# Patient Record
Sex: MALE | Race: WHITE | Employment: UNEMPLOYED | URBAN - METROPOLITAN AREA
[De-identification: names, ages, dates, MRNs, and addresses within clinical notes are randomized per-mention and may not be internally consistent; named-entity substitution may affect disease eponyms.]

---

## 2020-06-17 ENCOUNTER — HOSPITAL ENCOUNTER (INPATIENT)
Age: 35
LOS: 1 days | Discharge: HOME | DRG: 443 | End: 2020-06-18
Attending: EMERGENCY MEDICINE | Admitting: INTERNAL MEDICINE

## 2020-06-17 ENCOUNTER — APPOINTMENT (OUTPATIENT)
Dept: CT IMAGING | Age: 35
DRG: 443 | End: 2020-06-17

## 2020-06-17 ENCOUNTER — APPOINTMENT (OUTPATIENT)
Dept: ULTRASOUND IMAGING | Age: 35
DRG: 443 | End: 2020-06-17

## 2020-06-17 PROBLEM — R10.9 ABDOMINAL PAIN: Status: ACTIVE | Noted: 2020-06-17

## 2020-06-17 LAB
A/G RATIO: 1.4 (ref 1.1–2.2)
ACETAMINOPHEN LEVEL: <5 UG/ML (ref 10–30)
ALBUMIN SERPL-MCNC: 4.2 G/DL (ref 3.4–5)
ALP BLD-CCNC: 265 U/L (ref 40–129)
ALT SERPL-CCNC: 1863 U/L (ref 10–40)
ANION GAP SERPL CALCULATED.3IONS-SCNC: 13 MMOL/L (ref 3–16)
AST SERPL-CCNC: 1236 U/L (ref 15–37)
BASOPHILS ABSOLUTE: 0.1 K/UL (ref 0–0.2)
BASOPHILS RELATIVE PERCENT: 0.6 %
BILIRUB SERPL-MCNC: 4.4 MG/DL (ref 0–1)
BILIRUBIN URINE: ABNORMAL
BLOOD, URINE: ABNORMAL
BUN BLDV-MCNC: 10 MG/DL (ref 7–20)
CALCIUM SERPL-MCNC: 9 MG/DL (ref 8.3–10.6)
CHLORIDE BLD-SCNC: 93 MMOL/L (ref 99–110)
CLARITY: CLEAR
CO2: 22 MMOL/L (ref 21–32)
COLOR: ABNORMAL
CREAT SERPL-MCNC: 0.8 MG/DL (ref 0.9–1.3)
EOSINOPHILS ABSOLUTE: 0.3 K/UL (ref 0–0.6)
EOSINOPHILS RELATIVE PERCENT: 3.5 %
GFR AFRICAN AMERICAN: >60
GFR NON-AFRICAN AMERICAN: >60
GLOBULIN: 3 G/DL
GLUCOSE BLD-MCNC: 185 MG/DL (ref 70–99)
GLUCOSE URINE: 100 MG/DL
HCT VFR BLD CALC: 48.8 % (ref 40.5–52.5)
HEMOGLOBIN: 16.3 G/DL (ref 13.5–17.5)
INR BLD: 1.09 (ref 0.86–1.14)
KETONES, URINE: ABNORMAL MG/DL
LACTIC ACID, SEPSIS: 0.8 MMOL/L (ref 0.4–1.9)
LEUKOCYTE ESTERASE, URINE: NEGATIVE
LIPASE: 43 U/L (ref 13–60)
LYMPHOCYTES ABSOLUTE: 2.8 K/UL (ref 1–5.1)
LYMPHOCYTES RELATIVE PERCENT: 34.3 %
MCH RBC QN AUTO: 30.7 PG (ref 26–34)
MCHC RBC AUTO-ENTMCNC: 33.4 G/DL (ref 31–36)
MCV RBC AUTO: 91.8 FL (ref 80–100)
MICROSCOPIC EXAMINATION: YES
MONOCYTES ABSOLUTE: 1 K/UL (ref 0–1.3)
MONOCYTES RELATIVE PERCENT: 12.2 %
MUCUS: ABNORMAL /LPF
NEUTROPHILS ABSOLUTE: 4 K/UL (ref 1.7–7.7)
NEUTROPHILS RELATIVE PERCENT: 49.4 %
NITRITE, URINE: NEGATIVE
PDW BLD-RTO: 13.2 % (ref 12.4–15.4)
PH UA: 6.5 (ref 5–8)
PLATELET # BLD: 147 K/UL (ref 135–450)
PMV BLD AUTO: 8.7 FL (ref 5–10.5)
POTASSIUM SERPL-SCNC: 3.6 MMOL/L (ref 3.5–5.1)
PROTEIN UA: ABNORMAL MG/DL
PROTHROMBIN TIME: 12.6 SEC (ref 10–13.2)
RBC # BLD: 5.31 M/UL (ref 4.2–5.9)
RBC UA: ABNORMAL /HPF (ref 0–4)
SALICYLATE, SERUM: <0.3 MG/DL (ref 15–30)
SODIUM BLD-SCNC: 128 MMOL/L (ref 136–145)
SPECIFIC GRAVITY UA: 1.02 (ref 1–1.03)
TOTAL PROTEIN: 7.2 G/DL (ref 6.4–8.2)
URINE REFLEX TO CULTURE: ABNORMAL
URINE TYPE: ABNORMAL
UROBILINOGEN, URINE: 4 E.U./DL
WBC # BLD: 8.2 K/UL (ref 4–11)
WBC UA: ABNORMAL /HPF (ref 0–5)

## 2020-06-17 PROCEDURE — 2580000003 HC RX 258: Performed by: EMERGENCY MEDICINE

## 2020-06-17 PROCEDURE — 6370000000 HC RX 637 (ALT 250 FOR IP): Performed by: PHYSICIAN ASSISTANT

## 2020-06-17 PROCEDURE — 80053 COMPREHEN METABOLIC PANEL: CPT

## 2020-06-17 PROCEDURE — 83605 ASSAY OF LACTIC ACID: CPT

## 2020-06-17 PROCEDURE — 74176 CT ABD & PELVIS W/O CONTRAST: CPT

## 2020-06-17 PROCEDURE — 6370000000 HC RX 637 (ALT 250 FOR IP): Performed by: EMERGENCY MEDICINE

## 2020-06-17 PROCEDURE — 99285 EMERGENCY DEPT VISIT HI MDM: CPT

## 2020-06-17 PROCEDURE — 6360000002 HC RX W HCPCS: Performed by: PHYSICIAN ASSISTANT

## 2020-06-17 PROCEDURE — G0480 DRUG TEST DEF 1-7 CLASSES: HCPCS

## 2020-06-17 PROCEDURE — 2580000003 HC RX 258: Performed by: PHYSICIAN ASSISTANT

## 2020-06-17 PROCEDURE — 80074 ACUTE HEPATITIS PANEL: CPT

## 2020-06-17 PROCEDURE — 83690 ASSAY OF LIPASE: CPT

## 2020-06-17 PROCEDURE — 6360000002 HC RX W HCPCS: Performed by: EMERGENCY MEDICINE

## 2020-06-17 PROCEDURE — 96375 TX/PRO/DX INJ NEW DRUG ADDON: CPT

## 2020-06-17 PROCEDURE — 85610 PROTHROMBIN TIME: CPT

## 2020-06-17 PROCEDURE — 96374 THER/PROPH/DIAG INJ IV PUSH: CPT

## 2020-06-17 PROCEDURE — C9113 INJ PANTOPRAZOLE SODIUM, VIA: HCPCS | Performed by: PHYSICIAN ASSISTANT

## 2020-06-17 PROCEDURE — 99222 1ST HOSP IP/OBS MODERATE 55: CPT | Performed by: PHYSICIAN ASSISTANT

## 2020-06-17 PROCEDURE — 81001 URINALYSIS AUTO W/SCOPE: CPT

## 2020-06-17 PROCEDURE — 1200000000 HC SEMI PRIVATE

## 2020-06-17 PROCEDURE — 83036 HEMOGLOBIN GLYCOSYLATED A1C: CPT

## 2020-06-17 PROCEDURE — 76705 ECHO EXAM OF ABDOMEN: CPT

## 2020-06-17 PROCEDURE — 85025 COMPLETE CBC W/AUTO DIFF WBC: CPT

## 2020-06-17 PROCEDURE — 36415 COLL VENOUS BLD VENIPUNCTURE: CPT

## 2020-06-17 RX ORDER — PANTOPRAZOLE SODIUM 40 MG/10ML
40 INJECTION, POWDER, LYOPHILIZED, FOR SOLUTION INTRAVENOUS DAILY
Status: DISCONTINUED | OUTPATIENT
Start: 2020-06-17 | End: 2020-06-18 | Stop reason: HOSPADM

## 2020-06-17 RX ORDER — SODIUM CHLORIDE 0.9 % (FLUSH) 0.9 %
10 SYRINGE (ML) INJECTION EVERY 12 HOURS SCHEDULED
Status: DISCONTINUED | OUTPATIENT
Start: 2020-06-17 | End: 2020-06-18 | Stop reason: HOSPADM

## 2020-06-17 RX ORDER — PANTOPRAZOLE SODIUM 40 MG/1
40 TABLET, DELAYED RELEASE ORAL
Status: DISCONTINUED | OUTPATIENT
Start: 2020-06-18 | End: 2020-06-17

## 2020-06-17 RX ORDER — KETOROLAC TROMETHAMINE 30 MG/ML
15 INJECTION, SOLUTION INTRAMUSCULAR; INTRAVENOUS ONCE
Status: COMPLETED | OUTPATIENT
Start: 2020-06-17 | End: 2020-06-17

## 2020-06-17 RX ORDER — 0.9 % SODIUM CHLORIDE 0.9 %
1000 INTRAVENOUS SOLUTION INTRAVENOUS ONCE
Status: COMPLETED | OUTPATIENT
Start: 2020-06-17 | End: 2020-06-17

## 2020-06-17 RX ORDER — ONDANSETRON 2 MG/ML
4 INJECTION INTRAMUSCULAR; INTRAVENOUS ONCE
Status: COMPLETED | OUTPATIENT
Start: 2020-06-17 | End: 2020-06-17

## 2020-06-17 RX ORDER — ONDANSETRON 2 MG/ML
4 INJECTION INTRAMUSCULAR; INTRAVENOUS EVERY 6 HOURS PRN
Status: DISCONTINUED | OUTPATIENT
Start: 2020-06-17 | End: 2020-06-18 | Stop reason: HOSPADM

## 2020-06-17 RX ORDER — SODIUM CHLORIDE 0.9 % (FLUSH) 0.9 %
10 SYRINGE (ML) INJECTION PRN
Status: DISCONTINUED | OUTPATIENT
Start: 2020-06-17 | End: 2020-06-18 | Stop reason: HOSPADM

## 2020-06-17 RX ORDER — SODIUM CHLORIDE 9 MG/ML
INJECTION, SOLUTION INTRAVENOUS CONTINUOUS
Status: DISCONTINUED | OUTPATIENT
Start: 2020-06-17 | End: 2020-06-18 | Stop reason: HOSPADM

## 2020-06-17 RX ORDER — NICOTINE 21 MG/24HR
1 PATCH, TRANSDERMAL 24 HOURS TRANSDERMAL DAILY
Status: DISCONTINUED | OUTPATIENT
Start: 2020-06-17 | End: 2020-06-18 | Stop reason: HOSPADM

## 2020-06-17 RX ORDER — POLYETHYLENE GLYCOL 3350 17 G/17G
17 POWDER, FOR SOLUTION ORAL DAILY PRN
Status: DISCONTINUED | OUTPATIENT
Start: 2020-06-17 | End: 2020-06-18 | Stop reason: HOSPADM

## 2020-06-17 RX ORDER — OXYCODONE HYDROCHLORIDE 5 MG/1
5 TABLET ORAL EVERY 6 HOURS PRN
Status: DISCONTINUED | OUTPATIENT
Start: 2020-06-17 | End: 2020-06-18 | Stop reason: HOSPADM

## 2020-06-17 RX ORDER — ACETAMINOPHEN 650 MG/1
650 SUPPOSITORY RECTAL EVERY 6 HOURS PRN
Status: DISCONTINUED | OUTPATIENT
Start: 2020-06-17 | End: 2020-06-17

## 2020-06-17 RX ORDER — MORPHINE SULFATE 4 MG/ML
4 INJECTION, SOLUTION INTRAMUSCULAR; INTRAVENOUS ONCE
Status: COMPLETED | OUTPATIENT
Start: 2020-06-17 | End: 2020-06-17

## 2020-06-17 RX ORDER — PROMETHAZINE HYDROCHLORIDE 25 MG/1
12.5 TABLET ORAL EVERY 6 HOURS PRN
Status: DISCONTINUED | OUTPATIENT
Start: 2020-06-17 | End: 2020-06-18 | Stop reason: HOSPADM

## 2020-06-17 RX ORDER — ACETAMINOPHEN 325 MG/1
650 TABLET ORAL EVERY 6 HOURS PRN
Status: DISCONTINUED | OUTPATIENT
Start: 2020-06-17 | End: 2020-06-17

## 2020-06-17 RX ORDER — TRAMADOL HYDROCHLORIDE 50 MG/1
50 TABLET ORAL EVERY 6 HOURS PRN
Status: DISCONTINUED | OUTPATIENT
Start: 2020-06-17 | End: 2020-06-17

## 2020-06-17 RX ADMIN — SODIUM CHLORIDE: 9 INJECTION, SOLUTION INTRAVENOUS at 15:37

## 2020-06-17 RX ADMIN — KETOROLAC TROMETHAMINE 15 MG: 30 INJECTION, SOLUTION INTRAMUSCULAR at 08:53

## 2020-06-17 RX ADMIN — Medication 10 ML: at 21:28

## 2020-06-17 RX ADMIN — MORPHINE SULFATE 4 MG: 4 INJECTION, SOLUTION INTRAMUSCULAR; INTRAVENOUS at 12:28

## 2020-06-17 RX ADMIN — PANTOPRAZOLE SODIUM 40 MG: 40 INJECTION, POWDER, FOR SOLUTION INTRAVENOUS at 20:46

## 2020-06-17 RX ADMIN — OXYCODONE HYDROCHLORIDE 5 MG: 5 TABLET ORAL at 20:46

## 2020-06-17 RX ADMIN — ENOXAPARIN SODIUM 40 MG: 40 INJECTION SUBCUTANEOUS at 15:37

## 2020-06-17 RX ADMIN — ONDANSETRON HYDROCHLORIDE 4 MG: 2 INJECTION, SOLUTION INTRAMUSCULAR; INTRAVENOUS at 08:53

## 2020-06-17 RX ADMIN — TRAMADOL HYDROCHLORIDE 50 MG: 50 TABLET, FILM COATED ORAL at 15:37

## 2020-06-17 RX ADMIN — SODIUM CHLORIDE 1000 ML: 9 INJECTION, SOLUTION INTRAVENOUS at 08:53

## 2020-06-17 RX ADMIN — Medication 10 ML: at 20:53

## 2020-06-17 ASSESSMENT — ENCOUNTER SYMPTOMS
RHINORRHEA: 0
COUGH: 0
VOMITING: 0
BACK PAIN: 0
NAUSEA: 0
SHORTNESS OF BREATH: 0
DIARRHEA: 0
ABDOMINAL PAIN: 1
PHOTOPHOBIA: 0
WHEEZING: 0

## 2020-06-17 ASSESSMENT — PAIN DESCRIPTION - PAIN TYPE
TYPE: ACUTE PAIN

## 2020-06-17 ASSESSMENT — PAIN SCALES - GENERAL
PAINLEVEL_OUTOF10: 7
PAINLEVEL_OUTOF10: 8
PAINLEVEL_OUTOF10: 3
PAINLEVEL_OUTOF10: 7
PAINLEVEL_OUTOF10: 6
PAINLEVEL_OUTOF10: 6

## 2020-06-17 ASSESSMENT — PAIN DESCRIPTION - LOCATION
LOCATION: ABDOMEN

## 2020-06-17 NOTE — CONSULTS
Medications: sodium chloride flush, polyethylene glycol, promethazine **OR** ondansetron, traMADol  Allergies: Allergies   Allergen Reactions    Keflex [Cephalexin]        ROS:   Constitutional: negative for chills, fevers and sweats  Eyes: negative for cataracts, icterus and redness  Ears, nose, mouth, throat, and face: negative for epistaxis, hearing loss and sore throat  Respiratory: negative for cough, hemoptysis and sputum  Cardiovascular: negative for chest pain, dyspnea and lower extremity edema  Gastrointestinal: as per HPI  Genitourinary:negative for dysuria, frequency and hematuria  Neurological: negative for coordination problems, dizziness and gait problems  Behavioral/Psych: negative for anxiety, depression and mood swings    Physical Exam   /82   Pulse 76   Temp 97.7 °F (36.5 °C) (Oral)   Resp 16   Ht 6' 1\" (1.854 m)   Wt 190 lb (86.2 kg)   SpO2 98%   BMI 25.07 kg/m²     General appearance: alert, appears stated age, cooperative and no distress  Head: Normocephalic, without obvious abnormality, atraumatic  Eyes: conjunctivae/corneas clear. PERRL, EOM's intact. Fundi benign.   Neck: no adenopathy and supple, symmetrical, trachea midline  Lungs: clear to auscultation bilaterally  Heart: regular rate and rhythm, S1, S2 normal, no murmur, click, rub or gallop  Abdomen: normal findings: bowel sounds normal, no masses palpable and symmetric and abnormal findings:  distended, obese and tenderness moderate in the epigastrium, in the RUQ and in the left flank  Extremities: extremities normal, atraumatic, no cyanosis or edema    Lab and Imaging Review   Labs:  CBC:   Recent Labs     06/17/20  0835   WBC 8.2   HGB 16.3   HCT 48.8   MCV 91.8        BMP:   Recent Labs     06/17/20  0835   *   K 3.6   CL 93*   CO2 22   BUN 10   CREATININE 0.8*     LIVER PROFILE:   Recent Labs     06/17/20  0835   AST 1,236*   ALT 1,863*   LIPASE 43.0   PROT 7.2   BILITOT 4.4*   ALKPHOS 265*     PT/INR: gastritis, Naik's esophagus, or erosive gastritis. His elevated LFTs are likely related to active Hepatitis C superimposed with alcoholic hepatitis vs Hepatitis A or B infection. CT abd/pelvis and RUQ US are significant for stable liver morphology. Plan:   1. Continue supportive care  2. Monitor LFTs  3. Await  Acute Hepatitis Panel results  4. Monitor and document output  5.  alcohol, marijuana, tobacco cessation  6. PPI qAM  7. Advance to low fat diet as tolerated  8. Encourage nutrition  9. Consider eventual EGD   10. Consider additional blood tests pending hepatitis panel results including: HCV PCR Quant, autoimmune liver serologies, celiac panel, and iron studies  11. Will follow    Josselin Greene PA-C  4:17 PM 6/17/2020                      30year old male with a  history of chronic Hepatitis C, appendectomy, and hernia repair admitted with abdominal pain and acute hepatitis. Check hepatitis panel. Encourage nutrition. Ambulate TID. Start PPI. zofran as needed.     Mirna Cleveland MD          [phone]  [phone]

## 2020-06-17 NOTE — PROGRESS NOTES
Admission assessment completed. Pt denies open areas. Refused 4 eyes skin assessment. Patient is able to demonstrated the ability to move from a reclining position to an upright position within the recliner.

## 2020-06-17 NOTE — ED NOTES
Perfect serve message to Dr. Natalia Gan @ [id number]  06/17/20 1114    Yesi returned the call to Dr Jameson Wahl @ [id number]  06/17/20 1120

## 2020-06-17 NOTE — LETTER
[phone]  Phone: [phone]    No name on file. June 18, 2020     Patient: Landon Kerns   YOB: 1985   Date of Visit: 6/17/2020       To Whom It May Concern: It is my medical opinion that Landon Kerns was admitted to [facility] on 6/16/2020 and discharged on 6/18/2020. .    If you have any questions or concerns, please don't hesitate to call. Sincerely,        No name on file.

## 2020-06-18 VITALS
WEIGHT: 190 LBS | DIASTOLIC BLOOD PRESSURE: 72 MMHG | BODY MASS INDEX: 25.18 KG/M2 | OXYGEN SATURATION: 96 % | HEIGHT: 73 IN | TEMPERATURE: 98.6 F | HEART RATE: 74 BPM | RESPIRATION RATE: 14 BRPM | SYSTOLIC BLOOD PRESSURE: 121 MMHG

## 2020-06-18 LAB
ALBUMIN SERPL-MCNC: 3.4 G/DL (ref 3.4–5)
ALP BLD-CCNC: 207 U/L (ref 40–129)
ALT SERPL-CCNC: 1589 U/L (ref 10–40)
ANION GAP SERPL CALCULATED.3IONS-SCNC: 8 MMOL/L (ref 3–16)
AST SERPL-CCNC: 944 U/L (ref 15–37)
ATYPICAL LYMPHOCYTE RELATIVE PERCENT: 3 % (ref 0–6)
BASOPHILS ABSOLUTE: 0 K/UL (ref 0–0.2)
BASOPHILS RELATIVE PERCENT: 0 %
BILIRUB SERPL-MCNC: 4.2 MG/DL (ref 0–1)
BILIRUBIN DIRECT: 3.5 MG/DL (ref 0–0.3)
BILIRUBIN, INDIRECT: 0.7 MG/DL (ref 0–1)
BUN BLDV-MCNC: 8 MG/DL (ref 7–20)
CALCIUM SERPL-MCNC: 8.4 MG/DL (ref 8.3–10.6)
CHLORIDE BLD-SCNC: 99 MMOL/L (ref 99–110)
CO2: 23 MMOL/L (ref 21–32)
CREAT SERPL-MCNC: 0.9 MG/DL (ref 0.9–1.3)
EOSINOPHILS ABSOLUTE: 0.5 K/UL (ref 0–0.6)
EOSINOPHILS RELATIVE PERCENT: 5 %
ESTIMATED AVERAGE GLUCOSE: 102.5 MG/DL
GFR AFRICAN AMERICAN: >60
GFR NON-AFRICAN AMERICAN: >60
GLUCOSE BLD-MCNC: 110 MG/DL (ref 70–99)
HAV IGM SER IA-ACNC: ABNORMAL
HBA1C MFR BLD: 5.2 %
HCT VFR BLD CALC: 42.8 % (ref 40.5–52.5)
HEMOGLOBIN: 14.5 G/DL (ref 13.5–17.5)
HEPATITIS B CORE IGM ANTIBODY: ABNORMAL
HEPATITIS B SURFACE ANTIGEN INTERPRETATION: ABNORMAL
HEPATITIS C ANTIBODY INTERPRETATION: REACTIVE
LYMPHOCYTES ABSOLUTE: 4.7 K/UL (ref 1–5.1)
LYMPHOCYTES RELATIVE PERCENT: 48 %
MCH RBC QN AUTO: 30.8 PG (ref 26–34)
MCHC RBC AUTO-ENTMCNC: 33.8 G/DL (ref 31–36)
MCV RBC AUTO: 91.2 FL (ref 80–100)
MONOCYTES ABSOLUTE: 0.6 K/UL (ref 0–1.3)
MONOCYTES RELATIVE PERCENT: 6 %
NEUTROPHILS ABSOLUTE: 3.5 K/UL (ref 1.7–7.7)
NEUTROPHILS RELATIVE PERCENT: 38 %
PDW BLD-RTO: 13.2 % (ref 12.4–15.4)
PLATELET # BLD: 117 K/UL (ref 135–450)
PLATELET SLIDE REVIEW: ABNORMAL
PMV BLD AUTO: 9.5 FL (ref 5–10.5)
POTASSIUM REFLEX MAGNESIUM: 4.5 MMOL/L (ref 3.5–5.1)
RBC # BLD: 4.7 M/UL (ref 4.2–5.9)
SLIDE REVIEW: ABNORMAL
SODIUM BLD-SCNC: 130 MMOL/L (ref 136–145)
TOTAL PROTEIN: 6 G/DL (ref 6.4–8.2)
WBC # BLD: 9.2 K/UL (ref 4–11)

## 2020-06-18 PROCEDURE — 6370000000 HC RX 637 (ALT 250 FOR IP): Performed by: EMERGENCY MEDICINE

## 2020-06-18 PROCEDURE — 6370000000 HC RX 637 (ALT 250 FOR IP): Performed by: PHYSICIAN ASSISTANT

## 2020-06-18 PROCEDURE — C9113 INJ PANTOPRAZOLE SODIUM, VIA: HCPCS | Performed by: PHYSICIAN ASSISTANT

## 2020-06-18 PROCEDURE — 99238 HOSP IP/OBS DSCHRG MGMT 30/<: CPT | Performed by: INTERNAL MEDICINE

## 2020-06-18 PROCEDURE — 2580000003 HC RX 258: Performed by: PHYSICIAN ASSISTANT

## 2020-06-18 PROCEDURE — 80048 BASIC METABOLIC PNL TOTAL CA: CPT

## 2020-06-18 PROCEDURE — 85025 COMPLETE CBC W/AUTO DIFF WBC: CPT

## 2020-06-18 PROCEDURE — 36415 COLL VENOUS BLD VENIPUNCTURE: CPT

## 2020-06-18 PROCEDURE — 80076 HEPATIC FUNCTION PANEL: CPT

## 2020-06-18 PROCEDURE — 6360000002 HC RX W HCPCS: Performed by: PHYSICIAN ASSISTANT

## 2020-06-18 PROCEDURE — 87522 HEPATITIS C REVRS TRNSCRPJ: CPT

## 2020-06-18 RX ORDER — OXYCODONE HYDROCHLORIDE 5 MG/1
5 TABLET ORAL EVERY 6 HOURS PRN
Qty: 12 TABLET | Refills: 0 | Status: SHIPPED | OUTPATIENT
Start: 2020-06-18 | End: 2020-06-21

## 2020-06-18 RX ADMIN — ENOXAPARIN SODIUM 40 MG: 40 INJECTION SUBCUTANEOUS at 08:33

## 2020-06-18 RX ADMIN — PANTOPRAZOLE SODIUM 40 MG: 40 INJECTION, POWDER, FOR SOLUTION INTRAVENOUS at 08:33

## 2020-06-18 RX ADMIN — OXYCODONE HYDROCHLORIDE 5 MG: 5 TABLET ORAL at 03:23

## 2020-06-18 RX ADMIN — OXYCODONE HYDROCHLORIDE 5 MG: 5 TABLET ORAL at 09:34

## 2020-06-18 RX ADMIN — Medication 10 ML: at 08:34

## 2020-06-18 RX ADMIN — SODIUM CHLORIDE: 9 INJECTION, SOLUTION INTRAVENOUS at 03:23

## 2020-06-18 ASSESSMENT — PAIN SCALES - GENERAL
PAINLEVEL_OUTOF10: 5
PAINLEVEL_OUTOF10: 2
PAINLEVEL_OUTOF10: 5
PAINLEVEL_OUTOF10: 0

## 2020-06-18 ASSESSMENT — PAIN DESCRIPTION - DESCRIPTORS: DESCRIPTORS: DISCOMFORT

## 2020-06-18 ASSESSMENT — PAIN DESCRIPTION - FREQUENCY: FREQUENCY: CONTINUOUS

## 2020-06-18 ASSESSMENT — PAIN DESCRIPTION - ONSET: ONSET: ON-GOING

## 2020-06-18 ASSESSMENT — PAIN DESCRIPTION - PAIN TYPE: TYPE: ACUTE PAIN

## 2020-06-18 ASSESSMENT — PAIN DESCRIPTION - PROGRESSION: CLINICAL_PROGRESSION: GRADUALLY WORSENING

## 2020-06-18 ASSESSMENT — PAIN DESCRIPTION - LOCATION: LOCATION: ABDOMEN

## 2020-06-18 NOTE — PROGRESS NOTES
C/o lower back pain rate as 5/10. Requesting pain medication. Oxycodone given. See MAR. Call light in reach.

## 2020-06-18 NOTE — PROGRESS NOTES
Patient assessment completed, see flow sheet. Medications being administered per MAR. Patient in bed, call light is within reach, bed in lowest, locked position. Patient denies further needs at this time. Will continue to monitor and assess.

## 2020-06-18 NOTE — DISCHARGE SUMMARY
Name:  Kate Jeffers  Room:  0214/0214-01  MRN:    [id number]    Discharge Summary      This discharge summary is in conjunction with a complete physical exam done on the day of discharge. Discharging Physician: Briseyda Chavarria MD     Admit: 6/17/2020  Discharge:  6/18/2020    HPI taken from admission H&P:      The patient is a 30 y.o. male with no significant PMH who presented to [facility] ED with complaint of abdominal pain, nausea vomiting and back pain. Patient states that this all happened about 5 days ago. He states that he is recently been rosibel and thought maybe he had over exerted himself as he came home very nauseous and feeling unwell. He states for the last few days he has not even been able to go to work as his symptoms have continued to worsen. He is noticed a lot of right upper abdominal pain that radiates to his back and causes him severe nausea. He reports that he typically eats about 6 meals per day but has not been able to eat much over the last few days because of the pain. He reports some yellowing of his skin over the last few days as well. He reports a history of hepatitis C from prior drug abuse over 12 years ago, reports no prior treatment. He is a daily drinker of about 8-10 shots of whiskey for the last 5+ years. Patient states that over the last few days given all of his symptoms he has not been able to drink and has not had any concerning withdrawal symptoms.      Patient's wife is bedside and reports patient has had fevers up to 101 at home over the last few days as well. No known sick contacts.      Diagnoses this Admission and Hospital Course     Abdominal Pain  Transaminitis  Hyperbilirubinemia  - concern for acute hepatitis  - CT abdomen: mild inflammatory stranding w/in GB; U/S GB: contracted GB w/o findings of cholelithiasis  - ALT: 1863, AST 1236, Bili: 4.4, INR normal  - Admitted to Med Surg  - trended LFTs - with improvement, checked acute hepatitis panel - Hep C

## 2020-06-19 ENCOUNTER — HOSPITAL ENCOUNTER (EMERGENCY)
Age: 35
Discharge: HOME | End: 2020-06-19
Attending: EMERGENCY MEDICINE

## 2020-06-19 ENCOUNTER — TELEPHONE (OUTPATIENT)
Dept: INTERNAL MEDICINE CLINIC | Age: 35
End: 2020-06-19

## 2020-06-19 VITALS
HEART RATE: 82 BPM | TEMPERATURE: 98.4 F | SYSTOLIC BLOOD PRESSURE: 121 MMHG | OXYGEN SATURATION: 96 % | BODY MASS INDEX: 25.31 KG/M2 | DIASTOLIC BLOOD PRESSURE: 81 MMHG | HEIGHT: 73 IN | WEIGHT: 191 LBS | RESPIRATION RATE: 15 BRPM

## 2020-06-19 LAB
A/G RATIO: 1.3 (ref 1.1–2.2)
ALBUMIN SERPL-MCNC: 3.9 G/DL (ref 3.4–5)
ALP BLD-CCNC: 226 U/L (ref 40–129)
ALT SERPL-CCNC: 1366 U/L (ref 10–40)
ANION GAP SERPL CALCULATED.3IONS-SCNC: 8 MMOL/L (ref 3–16)
AST SERPL-CCNC: 528 U/L (ref 15–37)
ATYPICAL LYMPHOCYTE RELATIVE PERCENT: 28 % (ref 0–6)
BANDED NEUTROPHILS RELATIVE PERCENT: 3 % (ref 0–7)
BASOPHILS ABSOLUTE: 0.1 K/UL (ref 0–0.2)
BASOPHILS RELATIVE PERCENT: 1 %
BILIRUB SERPL-MCNC: 4.9 MG/DL (ref 0–1)
BILIRUBIN DIRECT: 4 MG/DL (ref 0–0.3)
BILIRUBIN, INDIRECT: 0.9 MG/DL (ref 0–1)
BUN BLDV-MCNC: 10 MG/DL (ref 7–20)
CALCIUM SERPL-MCNC: 8.9 MG/DL (ref 8.3–10.6)
CHLORIDE BLD-SCNC: 95 MMOL/L (ref 99–110)
CO2: 27 MMOL/L (ref 21–32)
CREAT SERPL-MCNC: 0.9 MG/DL (ref 0.9–1.3)
EKG ATRIAL RATE: 82 BPM
EKG DIAGNOSIS: NORMAL
EKG P AXIS: 40 DEGREES
EKG P-R INTERVAL: 196 MS
EKG Q-T INTERVAL: 348 MS
EKG QRS DURATION: 98 MS
EKG QTC CALCULATION (BAZETT): 406 MS
EKG R AXIS: 11 DEGREES
EKG T AXIS: 20 DEGREES
EKG VENTRICULAR RATE: 82 BPM
EOSINOPHILS ABSOLUTE: 0.1 K/UL (ref 0–0.6)
EOSINOPHILS RELATIVE PERCENT: 1 %
GFR AFRICAN AMERICAN: >60
GFR NON-AFRICAN AMERICAN: >60
GLOBULIN: 2.9 G/DL
GLUCOSE BLD-MCNC: 150 MG/DL (ref 70–99)
HCT VFR BLD CALC: 42.6 % (ref 40.5–52.5)
HEMATOLOGY PATH CONSULT: NORMAL
HEMATOLOGY PATH CONSULT: YES
HEMOGLOBIN: 14.7 G/DL (ref 13.5–17.5)
INR BLD: 1.11 (ref 0.86–1.14)
LIPASE: 34 U/L (ref 13–60)
LYMPHOCYTES ABSOLUTE: 7.7 K/UL (ref 1–5.1)
LYMPHOCYTES RELATIVE PERCENT: 39 %
MCH RBC QN AUTO: 31.2 PG (ref 26–34)
MCHC RBC AUTO-ENTMCNC: 34.4 G/DL (ref 31–36)
MCV RBC AUTO: 90.5 FL (ref 80–100)
MONOCYTES ABSOLUTE: 0.5 K/UL (ref 0–1.3)
MONOCYTES RELATIVE PERCENT: 4 %
NEUTROPHILS ABSOLUTE: 3.1 K/UL (ref 1.7–7.7)
NEUTROPHILS RELATIVE PERCENT: 24 %
PDW BLD-RTO: 13.4 % (ref 12.4–15.4)
PLATELET # BLD: 132 K/UL (ref 135–450)
PMV BLD AUTO: 8.9 FL (ref 5–10.5)
POTASSIUM SERPL-SCNC: 3.8 MMOL/L (ref 3.5–5.1)
PROTHROMBIN TIME: 12.9 SEC (ref 10–13.2)
RBC # BLD: 4.71 M/UL (ref 4.2–5.9)
RBC # BLD: NORMAL 10*6/UL
SODIUM BLD-SCNC: 130 MMOL/L (ref 136–145)
TOTAL PROTEIN: 6.8 G/DL (ref 6.4–8.2)
TROPONIN: <0.01 NG/ML
WBC # BLD: 11.5 K/UL (ref 4–11)

## 2020-06-19 PROCEDURE — 85610 PROTHROMBIN TIME: CPT

## 2020-06-19 PROCEDURE — 93005 ELECTROCARDIOGRAM TRACING: CPT | Performed by: EMERGENCY MEDICINE

## 2020-06-19 PROCEDURE — 36415 COLL VENOUS BLD VENIPUNCTURE: CPT

## 2020-06-19 PROCEDURE — 83690 ASSAY OF LIPASE: CPT

## 2020-06-19 PROCEDURE — 99283 EMERGENCY DEPT VISIT LOW MDM: CPT

## 2020-06-19 PROCEDURE — 84484 ASSAY OF TROPONIN QUANT: CPT

## 2020-06-19 PROCEDURE — 85025 COMPLETE CBC W/AUTO DIFF WBC: CPT

## 2020-06-19 PROCEDURE — 80053 COMPREHEN METABOLIC PANEL: CPT

## 2020-06-19 PROCEDURE — 82248 BILIRUBIN DIRECT: CPT

## 2020-06-19 PROCEDURE — 6370000000 HC RX 637 (ALT 250 FOR IP): Performed by: EMERGENCY MEDICINE

## 2020-06-19 PROCEDURE — 93010 ELECTROCARDIOGRAM REPORT: CPT | Performed by: INTERNAL MEDICINE

## 2020-06-19 RX ORDER — DIPHENHYDRAMINE HCL 25 MG
50 TABLET ORAL ONCE
Status: COMPLETED | OUTPATIENT
Start: 2020-06-19 | End: 2020-06-19

## 2020-06-19 RX ADMIN — DIPHENHYDRAMINE HCL 50 MG: 25 TABLET ORAL at 02:51

## 2020-06-19 ASSESSMENT — ENCOUNTER SYMPTOMS
BACK PAIN: 0
CHEST TIGHTNESS: 0
ABDOMINAL PAIN: 0
SHORTNESS OF BREATH: 1
STRIDOR: 0
NAUSEA: 0
COUGH: 0
WHEEZING: 0
VOMITING: 0
COLOR CHANGE: 0
DIARRHEA: 0

## 2020-06-19 NOTE — ED PROVIDER NOTES
Negative for chest pain, palpitations and leg swelling. Gastrointestinal: Negative for abdominal pain, diarrhea, nausea and vomiting. Genitourinary: Negative for decreased urine volume, difficulty urinating, dysuria, flank pain, frequency and urgency. Urine is dark colored per patient similar to when in the hospital     Musculoskeletal: Negative for back pain and neck pain. Skin: Positive for wound (near umbilicus with minimal bleeding noted per patient). Negative for color change. Neurological: Negative for syncope, weakness and headaches. Hematological: Does not bruise/bleed easily. Psychiatric/Behavioral: Negative for confusion. Positives and Pertinent negatives as per HPI. PASTMEDICAL HISTORY     Past Medical History:   Diagnosis Date    Liver cirrhosis ([facility] [address]          SURGICAL HISTORY       Past Surgical History:   Procedure Laterality Date    APPENDECTOMY      HERNIA REPAIR           CURRENT MEDICATIONS       Discharge Medication List as of 6/19/2020  3:17 AM      CONTINUE these medications which have NOT CHANGED    Details   oxyCODONE (ROXICODONE) 5 MG immediate release tablet Take 1 tablet by mouth every 6 hours as needed for Pain for up to 3 days. Intended supply: 3 days. Take lowest dose possible to manage pain, Disp-12 tablet, R-0Normal             ALLERGIES     Keflex [cephalexin]    FAMILY HISTORY     History reviewed. No pertinent family history.        SOCIAL HISTORY       Social History     Socioeconomic History    Marital status: Single     Spouse name: None    Number of children: None    Years of education: None    Highest education level: None   Occupational History    None   Social Needs    Financial resource strain: None    Food insecurity     Worry: None     Inability: None    Transportation needs     Medical: None     Non-medical: None   Tobacco Use    Smoking status: Current Every Day Smoker     Packs/day: 1.00     Types: Cigarettes    Smokeless tobacco: Never Used   Substance and Sexual Activity    Alcohol use: Yes     Comment: whiskey not every day    Drug use: Yes     Types: Marijuana    Sexual activity: Yes   Lifestyle    Physical activity     Days per week: None     Minutes per session: None    Stress: None   Relationships    Social connections     Talks on phone: None     Gets together: None     Attends [employer] service: None     Active member of club or organization: None     Attends meetings of clubs or organizations: None     Relationship status: None    Intimate partner violence     Fear of current or ex partner: None     Emotionally abused: None     Physically abused: None     Forced sexual activity: None   Other Topics Concern    None   Social History Narrative    None       SCREENINGS    Kelvin Coma Scale  Eye Opening: Spontaneous  Best Verbal Response: Oriented  Best Motor Response: Obeys commands  Kelvin Coma Scale Score: 15           PHYSICAL EXAM    (up to 7 for level 4, 8 or more for level 5)     ED Triage Vitals   BP Temp Temp src Pulse Resp SpO2 Height Weight   -- -- -- -- -- -- -- --       Physical Exam  Vitals signs and nursing note reviewed. Constitutional:       General: He is awake. He is not in acute distress. Appearance: Normal appearance. He is well-developed, well-groomed and overweight. He is not ill-appearing, toxic-appearing or diaphoretic. HENT:      Head: Normocephalic and atraumatic. Right Ear: External ear normal.      Left Ear: External ear normal.      Nose: Nose normal.      Mouth/Throat:      Mouth: Mucous membranes are moist.   Eyes:      General: Scleral icterus present. Right eye: No discharge. Left eye: No discharge. Neck:      Musculoskeletal: Full passive range of motion without pain, normal range of motion and neck supple. Normal range of motion. No edema, erythema, neck rigidity or muscular tenderness. Trachea: Trachea normal. No tracheal deviation. Cardiovascular:      Rate and Rhythm: Normal rate and regular rhythm. Pulses: Normal pulses. Pulmonary:      Effort: Pulmonary effort is normal. No accessory muscle usage or respiratory distress. Breath sounds: Normal breath sounds. No stridor. No decreased breath sounds, wheezing, rhonchi or rales. Chest:      Chest wall: No tenderness. Abdominal:      General: Abdomen is flat. Bowel sounds are normal. There is no distension. There are signs of injury (scratch noted to umbilicus). Palpations: Abdomen is soft. There is no shifting dullness. Tenderness: There is no abdominal tenderness. There is no guarding or rebound. Negative signs include Tolentino's sign and McBurney's sign. Musculoskeletal: Normal range of motion. General: No swelling, tenderness, deformity or signs of injury. Right lower leg: No edema. Left lower leg: No edema. Skin:     General: Skin is warm and dry. Coloration: Skin is jaundiced. Skin is not ashen, cyanotic or pale. Findings: Abrasion (near umbilicus, no laceration or active bleeding, appears to be a small scab in the umbilicus, no concern for infection or Flood's syndrome ) and wound present. No abscess, acne, bruising, burn, ecchymosis, erythema, signs of injury, laceration, lesion, petechiae or rash. Neurological:      General: No focal deficit present. Mental Status: He is alert and oriented to person, place, and time. Mental status is at baseline. GCS: GCS eye subscore is 4. GCS verbal subscore is 5. GCS motor subscore is 6. Motor: No tremor, atrophy, abnormal muscle tone or seizure activity. Psychiatric:         Mood and Affect: Mood normal.         Behavior: Behavior normal. Behavior is cooperative.              DIAGNOSTIC RESULTS   :    Labs Reviewed   CBC WITH AUTO DIFFERENTIAL - Abnormal; Notable for the following components:       Result Value    WBC 11.5 (*)     Platelets 378 (*)     Lymphocytes Absolute visit well. The patient and / or the family were informed of the results of any tests, a time was given to answer questions. FINAL IMPRESSION      1. Abrasion of abdominal wall, initial encounter    2. Acute hepatitis    3. Hyperbilirubinemia    4.  Lymphocytosis        DISPOSITION/PLAN   DISPOSITION  -discharged in improved, stable condition      PATIENT REFERRED TO:  [facility]  ED  [address]  Go to   If symptoms worsen    Lu Granados MD  [address] [place], [address] [address]    Schedule an appointment as soon as possible for a visit in 2 weeks        DISCHARGEMEDICATIONS:  Discharge Medication List as of 6/19/2020  3:17 AM          DISCONTINUED MEDICATIONS:  Discharge Medication List as of 6/19/2020  3:17 AM                 (Please note that portions of this note were completed with a voicerecognition program.  Efforts were made to edit the dictations but occasionally words are mis-transcribed.)    Sabrina De La Cruz MD (electronically signed)            Sabrina De La Cruz MD  06/19/20 [id number]

## 2020-06-20 LAB
HCV QNT BY NAAT IU/ML: ABNORMAL
HCV QNT BY NAAT LOG IU/ML: 5.16 LOG IU/ML
INTERPRETATION: DETECTED

## 2021-08-19 ENCOUNTER — HOSPITAL ENCOUNTER (EMERGENCY)
Age: 36
Discharge: HOME | End: 2021-08-19
Payer: COMMERCIAL

## 2021-08-19 VITALS
WEIGHT: 190 LBS | TEMPERATURE: 98.2 F | SYSTOLIC BLOOD PRESSURE: 139 MMHG | DIASTOLIC BLOOD PRESSURE: 99 MMHG | HEART RATE: 80 BPM | RESPIRATION RATE: 18 BRPM | HEIGHT: 73 IN | BODY MASS INDEX: 25.18 KG/M2 | OXYGEN SATURATION: 98 %

## 2021-08-19 DIAGNOSIS — Z20.822 LAB TEST NEGATIVE FOR COVID-19 VIRUS: Primary | ICD-10-CM

## 2021-08-19 LAB — SARS-COV-2, NAAT: NOT DETECTED

## 2021-08-19 PROCEDURE — 87635 SARS-COV-2 COVID-19 AMP PRB: CPT

## 2021-08-19 PROCEDURE — 99283 EMERGENCY DEPT VISIT LOW MDM: CPT

## 2021-08-19 ASSESSMENT — ENCOUNTER SYMPTOMS
DIARRHEA: 1
SHORTNESS OF BREATH: 0
ABDOMINAL PAIN: 0
VOMITING: 1
NAUSEA: 1
COUGH: 0

## 2021-08-20 NOTE — ED PROVIDER NOTES
[address] ED  EMERGENCY DEPARTMENT ENCOUNTER        Pt Name: Deanne Ellis  MRN: [id number]  [place] 1985  Date of evaluation: 8/19/2021  Provider: Selvin Varghese PA-C  PCP: No primary care provider on file. Shared Visit or Autonomous Visit: JOSE LUIS. I have evaluated this patient. My supervising physician was available for consultation. CHIEF COMPLAINT       Chief Complaint   Patient presents with    Concern For COVID-19     Pt had vomiting and diarrhea for 2 days (Tues. & Wed.) So pt's place of employment wants him to get a COVID test before going back to work. Pt states that he is feeling fine today, was able to eat and keep it down. HISTORY OF PRESENT ILLNESS   (Location/Symptom, Timing/Onset, Context/Setting, Quality, Duration, Modifying Factors, Severity)  Note limiting factors. Deanne Ellis is a 29 y.o. male presenting to the emergency department with request of COVID-19 test.  States his work told him he needed to be tested before he came back to work. He had missed the past couple days of work he had vomiting and diarrhea states symptoms have now resolved he is eating and drinking without any difficulty. States he just drank a large malt prior to coming him no longer having symptoms. No abdominal pain. No fever. No cough or URI symptoms. No chest pain or shortness of breath. States his child and significant other had similar symptoms last about 24 hours and their symptoms have also resolved. The history is provided by the patient. Nursing Notes were reviewed    REVIEW OF SYSTEMS    (2-9 systems for level 4, 10 or more for level 5)     Review of Systems   Constitutional: Negative for fever. HENT: Negative for congestion. Respiratory: Negative for cough and shortness of breath. Cardiovascular: Negative for chest pain. Gastrointestinal: Positive for diarrhea, nausea and vomiting. Negative for abdominal pain.        Positives and Pertinent negatives as per HPI. PAST MEDICAL HISTORY     Past Medical History:   Diagnosis Date    Hepatitis C 06/17/2020    Liver cirrhosis ([facility] [address]          SURGICAL HISTORY     Past Surgical History:   Procedure Laterality Date    APPENDECTOMY      HERNIA REPAIR           CURRENTMEDICATIONS       Previous Medications    No medications on file         ALLERGIES     Keflex [cephalexin]    FAMILYHISTORY     History reviewed. No pertinent family history. SOCIAL HISTORY       Social History     Socioeconomic History    Marital status: Single     Spouse name: None    Number of children: None    Years of education: None    Highest education level: None   Occupational History    None   Tobacco Use    Smoking status: Current Every Day Smoker     Packs/day: 1.00     Types: Cigarettes    Smokeless tobacco: Never Used   Vaping Use    Vaping Use: Never used   Substance and Sexual Activity    Alcohol use: Yes     Comment: whiskey not every day    Drug use: Yes     Types: Marijuana    Sexual activity: Yes   Other Topics Concern    None   Social History Narrative    None     Social Determinants of Health     Financial Resource Strain:     Difficulty of Paying Living Expenses:    Food Insecurity:     Worried About Running Out of Food in the Last Year:     Ran Out of Food in the Last Year:    Transportation Needs:     Lack of Transportation (Medical):      Lack of Transportation (Non-Medical):    Physical Activity:     Days of Exercise per Week:     Minutes of Exercise per Session:    Stress:     Feeling of Stress :    Social Connections:     Frequency of Communication with Friends and Family:     Frequency of Social Gatherings with Friends and Family:     Attends [employer] Services:     Active Member of Clubs or Organizations:     Attends Club or Organization Meetings:     Marital Status:    Intimate Partner Violence:     Fear of Current or Ex-Partner:     Emotionally Abused:     Physically Abused:  Sexually Abused:        SCREENINGS             PHYSICAL EXAM    (up to 7 for level 4, 8 or more for level 5)     ED Triage Vitals [08/19/21 2006]   BP Temp Temp Source Pulse Resp SpO2 Height Weight   (!) 139/99 98.2 °F (36.8 °C) Oral 80 18 98 % 6' 1\" (1.854 m) 190 lb (86.2 kg)       Physical Exam  Vitals and nursing note reviewed. Constitutional:       Appearance: He is well-developed. He is not ill-appearing or toxic-appearing. HENT:      Head: Normocephalic and atraumatic. Mouth/Throat:      Mouth: Mucous membranes are moist.      Pharynx: Oropharynx is clear. No pharyngeal swelling or posterior oropharyngeal erythema. Eyes:      Conjunctiva/sclera: Conjunctivae normal.   Cardiovascular:      Rate and Rhythm: Normal rate and regular rhythm. Heart sounds: Normal heart sounds. Pulmonary:      Effort: Pulmonary effort is normal. No respiratory distress. Breath sounds: Normal breath sounds. No stridor. No wheezing, rhonchi or rales. Abdominal:      General: Bowel sounds are normal.      Palpations: Abdomen is soft. Tenderness: There is no abdominal tenderness. There is no right CVA tenderness, left CVA tenderness, guarding or rebound. Skin:     General: Skin is warm and dry. Neurological:      Mental Status: He is alert and oriented to person, place, and time. Motor: No abnormal muscle tone. Psychiatric:         Behavior: Behavior normal.         DIAGNOSTIC RESULTS   LABS:    Labs Reviewed   COVID-19, RAPID    Narrative:     Performed at:  [facility] - [facility],  [place], [facility]   Phone [phone]       All other labs were within normal range or not returned as of this dictation. EKG: All EKG's are interpreted by the Emergency Department Physician in the absence of a cardiologist.  Please see their note for interpretation of EKG.       RADIOLOGY:   Non-plain film images such as CT, Ultrasound and MRI are read by the radiologist. Andrew Watts radiographic images are visualized andpreliminarily interpreted by the  ED Provider with the below findings:        Interpretation radha Radiologist below, if available at the time of this note:    No orders to display     No results found. PROCEDURES   Unless otherwise noted below, none     Procedures    CRITICAL CARE TIME   N/A    CONSULTS:  None      EMERGENCY DEPARTMENT COURSE and DIFFERENTIAL DIAGNOSIS/MDM:   Vitals:    Vitals:    08/19/21 2006   BP: (!) 139/99   Pulse: 80   Resp: 18   Temp: 98.2 °F (36.8 °C)   TempSrc: Oral   SpO2: 98%   Weight: 190 lb (86.2 kg)   Height: 6' 1\" (1.854 m)       Patient was given thefollowing medications:  Medications - No data to display        ED course  Patient presented to the ER requesting COVID-19 test.  States his employer is requiring him to be tested before he returns to work. States he had been sick with vomiting and diarrhea the past couple of days but that has now resolved. Denies any symptoms here. He has been eating and drinking normally today here without difficulty. Drinking water here. Abdomen soft and nontender on exam.  No rebound or guarding. Overall well-appearing. Moist mucous membranes. COVID-19 test was sent and has resulted is negative. Patient given copy of results. He will be discharged. Referral to primary care. Return for worsening. I estimate there is LOW risk for ACUTE APPENDICITIS, BOWEL OBSTRUCTION, CHOLECYSTITIS, DIVERTICULITIS, INCARCERATED HERNIA, PANCREATITIS, PERFORATED BOWEL, BOWEL ISCHEMIA, GONADAL TORSION, OR CARDIAC ISCHEMIA, thus I consider the discharge disposition reasonable. Also, there is no evidence or peritonitis, sepsis, or toxicity. FINAL IMPRESSION      1.  Lab test negative for COVID-19 virus          DISPOSITION/PLAN   DISPOSITION     PATIENT REFERREDTO:  [facility]) Pre-Services  [phone]    Primary care referral, As needed    [facility]) Pre-Services  [phone]          DISCHARGE MEDICATIONS:  New Prescriptions    No medications on file       DISCONTINUED MEDICATIONS:  Discontinued Medications    No medications on file              (Please note that portions ofthis note were completed with a voice recognition program.  Efforts were made to edit the dictations but occasionally words are mis-transcribed.)    Haseeb Crews PA-C (electronically signed)           Mike Verdin PA-C  08/19/21 [id number]

## 2021-09-19 ENCOUNTER — HOSPITAL ENCOUNTER (EMERGENCY)
Age: 36
Discharge: HOME | End: 2021-09-19
Payer: COMMERCIAL

## 2021-09-19 VITALS
BODY MASS INDEX: 26.51 KG/M2 | WEIGHT: 200 LBS | OXYGEN SATURATION: 99 % | TEMPERATURE: 97.5 F | DIASTOLIC BLOOD PRESSURE: 92 MMHG | HEIGHT: 73 IN | HEART RATE: 68 BPM | SYSTOLIC BLOOD PRESSURE: 137 MMHG | RESPIRATION RATE: 16 BRPM

## 2021-09-19 DIAGNOSIS — K08.89 DENTALGIA: Primary | ICD-10-CM

## 2021-09-19 DIAGNOSIS — S02.5XXA CLOSED FRACTURE OF TOOTH, INITIAL ENCOUNTER: ICD-10-CM

## 2021-09-19 DIAGNOSIS — K04.7 DENTAL INFECTION: ICD-10-CM

## 2021-09-19 PROCEDURE — 99285 EMERGENCY DEPT VISIT HI MDM: CPT

## 2021-09-19 PROCEDURE — 96372 THER/PROPH/DIAG INJ SC/IM: CPT

## 2021-09-19 PROCEDURE — 6370000000 HC RX 637 (ALT 250 FOR IP): Performed by: PHYSICIAN ASSISTANT

## 2021-09-19 PROCEDURE — 6360000002 HC RX W HCPCS: Performed by: PHYSICIAN ASSISTANT

## 2021-09-19 RX ORDER — AMOXICILLIN AND CLAVULANATE POTASSIUM 875; 125 MG/1; MG/1
1 TABLET, FILM COATED ORAL ONCE
Status: COMPLETED | OUTPATIENT
Start: 2021-09-19 | End: 2021-09-19

## 2021-09-19 RX ORDER — KETOROLAC TROMETHAMINE 30 MG/ML
15 INJECTION, SOLUTION INTRAMUSCULAR; INTRAVENOUS ONCE
Status: COMPLETED | OUTPATIENT
Start: 2021-09-19 | End: 2021-09-19

## 2021-09-19 RX ORDER — CHLORHEXIDINE GLUCONATE 0.12 MG/ML
15 RINSE ORAL 2 TIMES DAILY
Qty: 420 ML | Refills: 0 | Status: SHIPPED | OUTPATIENT
Start: 2021-09-19 | End: 2021-10-03

## 2021-09-19 RX ORDER — AMOXICILLIN AND CLAVULANATE POTASSIUM 875; 125 MG/1; MG/1
1 TABLET, FILM COATED ORAL 2 TIMES DAILY
Qty: 14 TABLET | Refills: 0 | Status: SHIPPED | OUTPATIENT
Start: 2021-09-19 | End: 2021-09-26

## 2021-09-19 RX ADMIN — AMOXICILLIN AND CLAVULANATE POTASSIUM 1 TABLET: 875; 125 TABLET, FILM COATED ORAL at 15:28

## 2021-09-19 RX ADMIN — KETOROLAC TROMETHAMINE 15 MG: 30 INJECTION, SOLUTION INTRAMUSCULAR; INTRAVENOUS at 15:28

## 2021-09-19 ASSESSMENT — PAIN SCALES - GENERAL
PAINLEVEL_OUTOF10: 10

## 2021-09-19 ASSESSMENT — PAIN DESCRIPTION - LOCATION: LOCATION: TEETH

## 2021-09-19 ASSESSMENT — PAIN DESCRIPTION - ORIENTATION: ORIENTATION: LEFT

## 2021-09-19 NOTE — ED PROVIDER NOTES
[address] ED  EMERGENCY DEPARTMENT ENCOUNTER        Pt Name: Priscilla Jackson  MRN: [id number]  [place] 1985  Date of evaluation: 9/19/2021  Provider: AUTUMN Chung  PCP: No primary care provider on file. This patient was not seen and evaluated by the attending physician No att. providers found. I have evaluated this patient. My supervising physician was available for consultation. CHIEF COMPLAINT       Chief Complaint   Patient presents with    Dental Pain     left sided swelling for a couple days; pain extreme unable to tolerate       HISTORY OF PRESENT ILLNESS   (Location/Symptom, Timing/Onset, Context/Setting, Quality, Duration, Modifying Factors, Severity)  Note limiting factors. Priscilla Jackson is a 29 y.o. male who presents via private vehicle from his home for evaluation of dental pain. ED Course as of Sep 19 1532   Sarai Gore Sep 19, 2021   1449 He started with left lower dental pain two days ago. The pain is constant. The tooth is broken and has been for a long time. He does smoke tobacco, he does have a dentist. He has tried motrin and tyleol max dose without improvement. He is still able to eat and drink. He denies fevers. He denies body aches or chills. [CS]   0339 He notes remote hx of IVDA not currently using. He has only had 800 mg motrin today. [CS]      ED Course User Index  [CS] [facility]     Nursing Notes were all reviewed and agreed with or any disagreements were addressed  in the HPI. Pt was seen during the [place] 19 pandemic. Appropriate PPE worn by ME during patient encounters. Pt seen during a time with constrained hospital bed capacity and other potential inpatient and outpatient resources were constrained due to the viral pandemic. REVIEW OF SYSTEMS    (2-9 systems for level 4, 10 or more for level 5)     Review of Systems    Positives and Pertinent negatives as per HPI.   Except as noted abovein the ROS, all other systems were reviewed and negative. PAST MEDICAL HISTORY     Past Medical History:   Diagnosis Date    Hepatitis C 06/17/2020    Liver cirrhosis ([facility] [address]          SURGICAL HISTORY     Past Surgical History:   Procedure Laterality Date    APPENDECTOMY      HERNIA REPAIR           CURRENTMEDICATIONS       Previous Medications    No medications on file         ALLERGIES     Keflex [cephalexin]    FAMILYHISTORY     History reviewed. No pertinent family history. SOCIAL HISTORY       Social History     Socioeconomic History    Marital status: Single     Spouse name: None    Number of children: None    Years of education: None    Highest education level: None   Occupational History    None   Tobacco Use    Smoking status: Current Every Day Smoker     Packs/day: 1.00     Types: Cigarettes    Smokeless tobacco: Never Used   Vaping Use    Vaping Use: Every day    Substances: Nicotine, Flavoring    Devices: Disposable   Substance and Sexual Activity    Alcohol use: Yes     Comment: whiskey not every day    Drug use: Yes     Types: Marijuana    Sexual activity: Yes   Other Topics Concern    None   Social History Narrative    None     Social Determinants of Health     Financial Resource Strain:     Difficulty of Paying Living Expenses:    Food Insecurity:     Worried About Running Out of Food in the Last Year:     Ran Out of Food in the Last Year:    Transportation Needs:     Lack of Transportation (Medical):      Lack of Transportation (Non-Medical):    Physical Activity:     Days of Exercise per Week:     Minutes of Exercise per Session:    Stress:     Feeling of Stress :    Social Connections:     Frequency of Communication with Friends and Family:     Frequency of Social Gatherings with Friends and Family:     Attends Islam Services:     Active Member of Clubs or Organizations:     Attends Club or Organization Meetings:     Marital Status:    Intimate Partner Violence:     Fear of Current or Ex-Partner:     Emotionally Abused:     Physically Abused:     Sexually Abused:        SCREENINGS    Kelvin Coma Scale  Eye Opening: Spontaneous  Best Verbal Response: Oriented  Best Motor Response: Obeys commands  Kelvin Coma Scale Score: 15        PHYSICAL EXAM    (up to 7 for level 4, 8 or more for level 5)     ED Triage Vitals [09/19/21 1527]   BP Temp Temp Source Pulse Resp SpO2 Height Weight   (!) 136/91 97.5 °F (36.4 °C) Oral 60 18 98 % 6' 1\" (1.854 m) 200 lb (90.7 kg)       Physical Exam  Vitals and nursing note reviewed. Constitutional:       General: He is awake. He is not in acute distress. Appearance: Normal appearance. He is well-developed. He is not diaphoretic. HENT:      Head: Normocephalic and atraumatic. No right periorbital erythema or left periorbital erythema. Jaw: There is normal jaw occlusion. No trismus, tenderness, swelling, pain on movement or malocclusion. Salivary Glands: Right salivary gland is not diffusely enlarged or tender. Left salivary gland is not diffusely enlarged or tender. Right Ear: External ear normal.      Left Ear: External ear normal.      Nose: Nose normal.      Mouth/Throat:      Lips: Pink. No lesions. Mouth: Mucous membranes are moist. No oral lesions or angioedema. Dentition: Abnormal dentition. Does not have dentures. Dental tenderness, gingival swelling and dental caries present. No dental abscesses or gum lesions. Tongue: No lesions. Tongue does not deviate from midline. Palate: No mass and lesions. Pharynx: Oropharynx is clear. Uvula midline. Tonsils: No tonsillar exudate. Comments: Tolerating oral secretions appropriately  Multiple broken teeth severe dental decay  Eyes:      General:         Right eye: No discharge. Left eye: No discharge. Cardiovascular:      Rate and Rhythm: Normal rate and regular rhythm. Heart sounds: Normal heart sounds. No murmur heard. No friction rub.  No mg IntraMUSCular Given 9/19/21 1528)   amoxicillin-clavulanate (AUGMENTIN) 875-125 MG per tablet 1 tablet (1 tablet Oral Given 9/19/21 1528)       PDMP Monitoring:    Last PDMP Bogdan as Reviewed [facility]):  Review User Review Instant Review Result            Urine Drug Screenings (1 yr)    No resulted procedures found. Medication Contract and Consent for Opioid Use Documents Filed      No documents found                MDM:   Patient seen and evaluated. Old records reviewed. Diagnostic testing reviewed and results discussed. I have independently evaluated this patient based upon my scope of practice. Supervising physician was in the department for consultation as needed. 29-year-old male, presents for dental pain. He has multiple dental fractures, he specifically has pain and tenderness around left lower tooth which is fractured and this is old. No identifiable dental abscess. No concern for deep neck space infection currently. Patient was offered but declined dental block. He will be sent home on antibiotics, instructed on OTC analgesics and given dental list.  Strict ER return precautions advised. Based on patient's clinical history and clinical findings I currently estimate there is risk for : deep space infection (ludwigs, RPA, cavernous sinus), epiglottitis, meninginitis, or airway compromise. There is currently no evidence suggesting sepsis or toxicity. We have discussed the symptoms which are most concerning (e.g., changing or worsening pain, trouble swallowing or breathing, inability to open mouth, neck stiffness or fever) that necessitate immediate return. Pt is in agreement with the current plan and all questions were addressed. Discharge Time out:  CC Reviewed Yes   Test Results Yes     Vitals:    09/19/21 1527   BP: (!) 136/91   Pulse: 60   Resp: 18   Temp: 97.5 °F (36.4 °C)   SpO2: 98%              FINAL IMPRESSION      1. Dentalgia    2.  Closed fracture of tooth, initial encounter    3. Dental infection          DISPOSITION/PLAN   DISPOSITION Decision To Discharge 09/19/2021 03:31:29 PM      PATIENT REFERREDTO:  Your dentist    Schedule an appointment as soon as possible for a visit       [place] [facility]  [address] [facility]  [phone]  Go to   If symptoms worsen      DISCHARGE MEDICATIONS:  New Prescriptions    AMOXICILLIN-CLAVULANATE (AUGMENTIN) 875-125 MG PER TABLET    Take 1 tablet by mouth 2 times daily for 7 days    CHLORHEXIDINE (PERIDEX) 0.12 % SOLUTION    Take 15 mLs by mouth 2 times daily for 14 days    MAGIC MOUTHWASH (MIRACLE MOUTHWASH)    Swish and spit 10 mLs 4 times daily as needed for Irritation Dispense as Magic Mouthwash. Please add Equal Parts: 60 mL Maalox, 60 mL Viscous Lidocaine, 60 mL Benadryl to 60mL of Carafate. 10 ml swish and spit or swallow as directed above.        DISCONTINUED MEDICATIONS:  Discontinued Medications    No medications on file              (Please note that portions ofthis note were completed with a voice recognition program.  Efforts were made to edit the dictations but occasionally words are mis-transcribed.)    Willow Castro, Trip Villafana (electronically signed)        Trip Gilliam  09/19/21 8423

## 2021-11-23 ENCOUNTER — HOSPITAL ENCOUNTER (EMERGENCY)
Age: 36
Discharge: HOME | End: 2021-11-23
Payer: COMMERCIAL

## 2021-11-23 VITALS
RESPIRATION RATE: 16 BRPM | WEIGHT: 190 LBS | HEIGHT: 74 IN | BODY MASS INDEX: 24.38 KG/M2 | HEART RATE: 72 BPM | SYSTOLIC BLOOD PRESSURE: 131 MMHG | OXYGEN SATURATION: 99 % | TEMPERATURE: 97.1 F | DIASTOLIC BLOOD PRESSURE: 70 MMHG

## 2021-11-23 DIAGNOSIS — B97.89 VIRAL RESPIRATORY ILLNESS: Primary | ICD-10-CM

## 2021-11-23 DIAGNOSIS — F17.200 SMOKER: ICD-10-CM

## 2021-11-23 DIAGNOSIS — J98.8 VIRAL RESPIRATORY ILLNESS: Primary | ICD-10-CM

## 2021-11-23 LAB
INFLUENZA A: NOT DETECTED
INFLUENZA B: NOT DETECTED
S PYO AG THROAT QL: NEGATIVE
SARS-COV-2 RNA, RT PCR: NOT DETECTED

## 2021-11-23 PROCEDURE — 87081 CULTURE SCREEN ONLY: CPT

## 2021-11-23 PROCEDURE — 87636 SARSCOV2 & INF A&B AMP PRB: CPT

## 2021-11-23 PROCEDURE — 87880 STREP A ASSAY W/OPTIC: CPT

## 2021-11-23 PROCEDURE — 99281 EMR DPT VST MAYX REQ PHY/QHP: CPT

## 2021-11-23 RX ORDER — BUPRENORPHINE AND NALOXONE 2; .5 MG/1; MG/1
1 FILM, SOLUBLE BUCCAL; SUBLINGUAL DAILY
COMMUNITY
End: 2022-06-08 | Stop reason: ALTCHOICE

## 2021-11-23 NOTE — ED PROVIDER NOTES
[address] ED  EMERGENCY DEPARTMENT ENCOUNTER        Pt Name: Charity Haywood  MRN: [id number]  [place] 1985  Date of evaluation: 11/23/2021  Provider: Violeta Melton PA-C  PCP: No primary care provider on file. Note Started: 2:07 PM EST       JOSE LUIS. I have evaluated this patient. My supervising physician was available for consultation. DO Gary      CHIEF COMPLAINT       Chief Complaint   Patient presents with    Concern For COVID-19     pt states he wants a covid test. a friend who he hasnt been around but his family has tested positive. pt states \"I havent been feeling bad, but we all came to get tested\"       HISTORY OF PRESENT ILLNESS   (Location, Timing/Onset, Context/Setting, Quality, Duration, Modifying Factors, Severity, Associated Signs and Symptoms)  Note limiting factors. Chief Complaint: Sore throat, cough, fever    Charity Haywood is a 29 y.o. male who presents with the above complaint. Onset 1 week ago with cough described as occasional.  He relates this to cigarette smoking. He indicates strep exposure and Covid exposure. Patient states he has bad teeth. He had some leftover amoxicillin he initiated that a few days ago. Throat feels somewhat better. He reports no rash, gastrointestinal or urinary complaints. He has not had the Covid vaccination. He has had no medication prior to coming to ED. His ED temp is 97.1 and pulse 72. Nursing Notes were all reviewed and agreed with or any disagreements were addressed in the HPI. REVIEW OF SYSTEMS    (2-9 systems for level 4, 10 or more for level 5)     Review of Systems    Positives and Pertinent negatives as per HPI. Except as noted above in the ROS, all other systems were reviewed and negative.        PAST MEDICAL HISTORY     Past Medical History:   Diagnosis Date    Hepatitis C 06/17/2020    Liver cirrhosis ([facility] [address]          SURGICAL HISTORY     Past Surgical History:   Procedure Laterality Date    APPENDECTOMY      HERNIA REPAIR           CURRENTMEDICATIONS       Previous Medications    BUPRENORPHINE-NALOXONE (SUBOXONE) 2-0.5 MG FILM SL FILM    Place 1 Film under the tongue daily. MAGIC MOUTHWASH (MIRACLE MOUTHWASH)    Swish and spit 10 mLs 4 times daily as needed for Irritation Dispense as Magic Mouthwash. Please add Equal Parts: 60 mL Maalox, 60 mL Viscous Lidocaine, 60 mL Benadryl to 60mL of Carafate. 10 ml swish and spit or swallow as directed above. ALLERGIES     Keflex [cephalexin]    FAMILYHISTORY     History reviewed. No pertinent family history. SOCIAL HISTORY       Social History     Tobacco Use    Smoking status: Current Every Day Smoker     Packs/day: 1.00     Types: Cigarettes    Smokeless tobacco: Never Used   Vaping Use    Vaping Use: Every day    Substances: Nicotine, Flavoring    Devices: Disposable   Substance Use Topics    Alcohol use: Yes     Comment: whiskey not every day    Drug use: Yes     Types: Marijuana (Weed)       SCREENINGS             PHYSICAL EXAM    (up to 7 for level 4, 8 or more for level 5)     ED Triage Vitals [11/23/21 1315]   BP Temp Temp Source Pulse Resp SpO2 Height Weight   131/70 97.1 °F (36.2 °C) Oral 72 16 99 % 6' 2\" (1.88 m) 190 lb (86.2 kg)       Physical Exam  Vitals and nursing note reviewed. Constitutional:       Appearance: Normal appearance. He is well-developed and normal weight. HENT:      Head: Normocephalic and atraumatic. Right Ear: External ear normal.      Left Ear: External ear normal.      Mouth/Throat:      Mouth: Mucous membranes are moist.      Pharynx: Oropharynx is clear. Posterior oropharyngeal erythema present. Eyes:      General: No scleral icterus. Right eye: No discharge. Left eye: No discharge. Conjunctiva/sclera: Conjunctivae normal.   Cardiovascular:      Rate and Rhythm: Normal rate and regular rhythm. Heart sounds: Normal heart sounds.    Pulmonary:      Effort: Pulmonary effort is normal.      Breath sounds: Normal breath sounds. Musculoskeletal:         General: Normal range of motion. Cervical back: Normal range of motion and neck supple. Lymphadenopathy:      Cervical: No cervical adenopathy. Skin:     General: Skin is warm and dry. Neurological:      General: No focal deficit present. Mental Status: He is alert and oriented to person, place, and time. Mental status is at baseline. Psychiatric:         Mood and Affect: Mood normal.         Behavior: Behavior normal.         Thought Content: Thought content normal.         Judgment: Judgment normal.         DIAGNOSTIC RESULTS   LABS:    Labs Reviewed   COVID-19 & INFLUENZA COMBO    Narrative:     Performed at:  [facility] [address],  [place], [facility]   Phone [phone]   STREP SCREEN GROUP A THROAT    Narrative:     Performed at:  [facility]) [facility] [address],  [place], [facility]   Phone [phone]   CULTURE, BETA STREP CONFIRM PLATES       When ordered only abnormal lab results are displayed. All other labs were within normal range or not returned as of this dictation. EKG: When ordered, EKG's are interpreted by the Emergency Department Physician in the absence of a cardiologist.  Please see their note for interpretation of EKG. RADIOLOGY:   Non-plain film images such as CT, Ultrasound and MRI are read by the radiologist. Plain radiographic images are visualized and preliminarily interpreted by the ED Provider with the below findings:        Interpretation per the Radiologist below, if available at the time of this note:    No orders to display     No results found.         PROCEDURES   Unless otherwise noted below, none     Procedures    CRITICAL CARE TIME   N/A    CONSULTS:  None      EMERGENCY DEPARTMENT COURSE and DIFFERENTIAL DIAGNOSIS/MDM:   Vitals:    Vitals:    11/23/21 1315   BP: 131/70   Pulse: 72   Resp: 16

## 2021-11-25 LAB — S PYO THROAT QL CULT: NORMAL

## 2021-11-27 ENCOUNTER — HOSPITAL ENCOUNTER (EMERGENCY)
Age: 36
Discharge: HOME | End: 2021-11-27
Attending: EMERGENCY MEDICINE
Payer: COMMERCIAL

## 2021-11-27 ENCOUNTER — APPOINTMENT (OUTPATIENT)
Dept: GENERAL RADIOLOGY | Age: 36
End: 2021-11-27
Payer: COMMERCIAL

## 2021-11-27 VITALS
HEART RATE: 96 BPM | OXYGEN SATURATION: 100 % | RESPIRATION RATE: 16 BRPM | TEMPERATURE: 98.5 F | DIASTOLIC BLOOD PRESSURE: 91 MMHG | SYSTOLIC BLOOD PRESSURE: 121 MMHG

## 2021-11-27 DIAGNOSIS — S29.9XXA CHEST WALL INJURY, INITIAL ENCOUNTER: ICD-10-CM
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
## 2021-11-27 PROCEDURE — 99284 EMERGENCY DEPT VISIT MOD MDM: CPT

## 2021-11-27 PROCEDURE — 6370000000 HC RX 637 (ALT 250 FOR IP): Performed by: EMERGENCY MEDICINE

## 2021-11-27 PROCEDURE — 71046 X-RAY EXAM CHEST 2 VIEWS: CPT

## 2021-11-27 PROCEDURE — 93005 ELECTROCARDIOGRAM TRACING: CPT | Performed by: EMERGENCY MEDICINE

## 2021-11-27 RX ORDER — TIZANIDINE 4 MG/1
4 TABLET ORAL EVERY 6 HOURS PRN
Qty: 28 TABLET | Refills: 0 | Status: SHIPPED | OUTPATIENT
Start: 2021-11-27 | End: 2021-12-04

## 2021-11-27 RX ORDER — NAPROXEN 500 MG/1
500 TABLET ORAL 2 TIMES DAILY PRN
Qty: 20 TABLET | Refills: 0 | Status: SHIPPED | OUTPATIENT
Start: 2021-11-27 | End: 2022-06-08

## 2021-11-27 RX ORDER — IBUPROFEN 400 MG/1
800 TABLET ORAL ONCE
Status: COMPLETED | OUTPATIENT
Start: 2021-11-27 | End: 2021-11-27

## 2021-11-27 RX ADMIN — IBUPROFEN 800 MG: 400 TABLET, FILM COATED ORAL at 19:40

## 2021-11-27 ASSESSMENT — PAIN SCALES - GENERAL
PAINLEVEL_OUTOF10: 5
PAINLEVEL_OUTOF10: 5
PAINLEVEL_OUTOF10: 8

## 2021-11-27 ASSESSMENT — PAIN DESCRIPTION - PAIN TYPE
TYPE: ACUTE PAIN
TYPE: ACUTE PAIN

## 2021-11-27 ASSESSMENT — PAIN DESCRIPTION - LOCATION
LOCATION: CHEST;BACK
LOCATION: CHEST;BACK

## 2021-11-27 NOTE — Clinical Note
Anabel Aviles was seen and treated in our emergency department on 11/27/2021. He may return to work on 11/29/2021. If you have any questions or concerns, please don't hesitate to call.       Mitzy Brito MD

## 2021-11-27 NOTE — Clinical Note
Lele Prince was seen and treated in our emergency department on 11/27/2021. He may return to work on 11/29/2021. If you have any questions or concerns, please don't hesitate to call.       Latasha Abbott MD

## 2021-11-28 LAB
EKG ATRIAL RATE: 94 BPM
EKG DIAGNOSIS: NORMAL
EKG P AXIS: 59 DEGREES
EKG P-R INTERVAL: 166 MS
EKG Q-T INTERVAL: 350 MS
EKG QRS DURATION: 96 MS
EKG QTC CALCULATION (BAZETT): 437 MS
EKG R AXIS: 47 DEGREES
EKG T AXIS: 42 DEGREES
EKG VENTRICULAR RATE: 94 BPM

## 2021-11-28 PROCEDURE — 93010 ELECTROCARDIOGRAM REPORT: CPT | Performed by: INTERNAL MEDICINE
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
## 2021-11-28 NOTE — ED NOTES
--Patient provided with discharge instructions and any prescriptions. --Instructions, dosing, and follow-up appointments reviewed with patient/family. No further questions or needs at this time. --Vital signs and patient stable upon discharge. --Patient ambulatory to [facility].        Phani Braswell RN  11/27/21 7628

## 2021-12-09 ENCOUNTER — HOSPITAL ENCOUNTER (EMERGENCY)
Age: 36
Discharge: HOME | End: 2021-12-09
Payer: COMMERCIAL

## 2021-12-09 ENCOUNTER — APPOINTMENT (OUTPATIENT)
Dept: GENERAL RADIOLOGY | Age: 36
End: 2021-12-09
Payer: COMMERCIAL

## 2021-12-09 VITALS
BODY MASS INDEX: 25.18 KG/M2 | HEART RATE: 78 BPM | HEIGHT: 73 IN | TEMPERATURE: 97.6 F | SYSTOLIC BLOOD PRESSURE: 140 MMHG | OXYGEN SATURATION: 98 % | DIASTOLIC BLOOD PRESSURE: 91 MMHG | WEIGHT: 190 LBS | RESPIRATION RATE: 17 BRPM

## 2021-12-09 DIAGNOSIS — R03.0 ELEVATED BLOOD PRESSURE READING: ICD-10-CM

## 2021-12-09 DIAGNOSIS — R05.9 COUGH: ICD-10-CM

## 2021-12-09 DIAGNOSIS — J01.00 ACUTE MAXILLARY SINUSITIS, RECURRENCE NOT SPECIFIED: Primary | ICD-10-CM

## 2021-12-09 PROCEDURE — 99283 EMERGENCY DEPT VISIT LOW MDM: CPT

## 2021-12-09 PROCEDURE — 71046 X-RAY EXAM CHEST 2 VIEWS: CPT

## 2021-12-09 RX ORDER — BENZONATATE 100 MG/1
100 CAPSULE ORAL 3 TIMES DAILY PRN
Qty: 20 CAPSULE | Refills: 0 | Status: SHIPPED | OUTPATIENT
Start: 2021-12-09 | End: 2021-12-16

## 2021-12-09 RX ORDER — FLUTICASONE PROPIONATE 50 MCG
1 SPRAY, SUSPENSION (ML) NASAL DAILY
Qty: 16 G | Refills: 0 | Status: SHIPPED | OUTPATIENT
Start: 2021-12-09 | End: 2022-06-08

## 2021-12-09 RX ORDER — DOXYCYCLINE HYCLATE 100 MG
100 TABLET ORAL 2 TIMES DAILY
Qty: 20 TABLET | Refills: 0 | Status: SHIPPED | OUTPATIENT
Start: 2021-12-09 | End: 2021-12-19

## 2021-12-09 ASSESSMENT — ENCOUNTER SYMPTOMS
SINUS PRESSURE: 1
VOMITING: 0
SHORTNESS OF BREATH: 1
RHINORRHEA: 1
ABDOMINAL PAIN: 0
COUGH: 1
SINUS PAIN: 1

## 2021-12-09 ASSESSMENT — PAIN SCALES - GENERAL: PAINLEVEL_OUTOF10: 0

## 2021-12-09 NOTE — ED PROVIDER NOTES
[address] ED  EMERGENCY DEPARTMENT ENCOUNTER        Pt Name: Alvin Plata  MRN: [id number]  [place] 1985  Date of evaluation: 12/9/2021  Provider: Cate Amezquita PA-C  PCP: No primary care provider on file. Shared Visit or Autonomous Visit: JOSE LUIS. I have evaluated this patient. My supervising physician was available for consultation. CHIEF COMPLAINT       Chief Complaint   Patient presents with    Cough     Productive cough x 1 month pt states he is coughing up chunks of his lungs. Pt states he is a smoker. HISTORY OF PRESENT ILLNESS   (Location/Symptom, Timing/Onset, Context/Setting, Quality, Duration, Modifying Factors, Severity)  Note limiting factors. Alvin Plata is a 29 y.o. male presenting to the emergency department for evaluation of URI symptoms for the past 1 month. States a lot of sinus drainage and also coughing up phlegm is thick and hard. Having coughing fits make him short of breath. No chronic lung problems. He is a smoker. No chest pain. History of liver disease hepatitis. No abdominal pain no vomiting. No fever. States he has been tested for Covid 19 several times since he has been sick and they have all come back negative. The history is provided by the patient. URI  Presenting symptoms: congestion, cough and rhinorrhea    Presenting symptoms: no fever    Onset quality:  Gradual  Duration:  1 month  Progression:  Unchanged  Chronicity:  New  Associated symptoms: sinus pain    Risk factors: no chronic respiratory disease        Nursing Notes were reviewed    REVIEW OF SYSTEMS    (2-9 systems for level 4, 10 or more for level 5)     Review of Systems   Constitutional: Negative for fever. HENT: Positive for congestion, rhinorrhea, sinus pressure and sinus pain. Respiratory: Positive for cough and shortness of breath. Cardiovascular: Negative for chest pain and leg swelling.    Gastrointestinal: Negative for abdominal pain and Transportation Needs:     Lack of Transportation (Medical): Not on file    Lack of Transportation (Non-Medical): Not on file   Physical Activity:     Days of Exercise per Week: Not on file    Minutes of Exercise per Session: Not on file   Stress:     Feeling of Stress : Not on file   Social Connections:     Frequency of Communication with Friends and Family: Not on file    Frequency of Social Gatherings with Friends and Family: Not on file    Attends Restoration Services: Not on file    Active Member of [address] [place] [address] [email or web address] or Organizations: Not on file    Attends Club or Organization Meetings: Not on file    Marital Status: Not on file   Intimate Partner Violence:     Fear of Current or Ex-Partner: Not on file    Emotionally Abused: Not on file    Physically Abused: Not on file    Sexually Abused: Not on file   Housing Stability:     Unable to Pay for Housing in the Last Year: Not on file    Number of [place] in the Last Year: Not on file    Unstable Housing in the Last Year: Not on file       SCREENINGS             PHYSICAL EXAM    (up to 7 for level 4, 8 or more for level 5)     ED Triage Vitals [12/09/21 1246]   BP Temp Temp Source Pulse Resp SpO2 Height Weight   (!) 143/94 97.6 °F (36.4 °C) Oral 81 18 100 % 6' 1\" (1.854 m) 190 lb (86.2 kg)       Physical Exam  Vitals and nursing note reviewed. Constitutional:       Appearance: He is well-developed. He is not toxic-appearing. HENT:      Head: Normocephalic and atraumatic. Right Ear: Tympanic membrane and ear canal normal. No drainage or swelling. Tympanic membrane is not erythematous or bulging. Left Ear: Tympanic membrane and ear canal normal. No drainage or swelling. Tympanic membrane is not bulging. Nose: Congestion present. Right Sinus: Maxillary sinus tenderness present. Left Sinus: Maxillary sinus tenderness present. Mouth/Throat:      Mouth: Mucous membranes are moist.      Pharynx: Oropharynx is clear.  [place] states he is coughing up chunks of his lungs. Pt states he is a smoker. ) FINDINGS: HEART/MEDIASTINUM: The cardiomediastinal silhouette is within normal limits. PLEURA/LUNGS: There are no focal consolidations or pleural effusions. There is no appreciable pneumothorax. BONES/SOFT TISSUE: No acute abnormality. No radiographic evidence of acute pulmonary disease. PROCEDURES   Unless otherwise noted below, none     Procedures    CRITICAL CARE TIME   N/A    CONSULTS:  None      EMERGENCY DEPARTMENT COURSE and DIFFERENTIAL DIAGNOSIS/MDM:   Vitals:    Vitals:    12/09/21 1246   BP: (!) 143/94   Pulse: 81   Resp: 18   Temp: 97.6 °F (36.4 °C)   TempSrc: Oral   SpO2: 100%   Weight: 190 lb (86.2 kg)   Height: 6' 1\" (1.854 m)       Patient was given thefollowing medications:  Medications - No data to display        ED course  Patient presented to the ER for evaluation of URI symptoms states he has been sick for the past month. Chest x-ray was obtained no acute cardiopulmonary disease no pneumonia no pleural effusions. No wheezes rales or rhonchi on exam.  Normal respirations. Oxygen saturation 100% on room air. He is got postnasal drip sinus congestion maxillary sinus tenderness suspect sinusitis. Treated with doxycycline, Flonase, Tessalon as needed for cough. Referral to primary care for close follow-up. Return for worsening. I estimate there is LOW risk for PNEUMONIA, MENINGITIS, PERITONSILLAR ABSCESS, SEPSIS, MALIGNANT OTITIS EXTERNA, OR EPIGLOTTITIS thus I consider the discharge disposition reasonable. FINAL IMPRESSION      1. Acute maxillary sinusitis, recurrence not specified    2.  Cough    3. Elevated blood pressure reading          DISPOSITION/PLAN   DISPOSITION Decision to Discharge    PATIENT REFERREDTO:  William Montague  [phone]  In 1 week  Primary care referral    [facility] ED  [address] [facility]  [phone]    If symptoms worsen      DISCHARGE MEDICATIONS:  New Prescriptions    BENZONATATE (TESSALON PERLES) 100 MG CAPSULE    Take 1 capsule by mouth 3 times daily as needed for Cough    DOXYCYCLINE HYCLATE (VIBRA-TABS) 100 MG TABLET    Take 1 tablet by mouth 2 times daily for 10 days    FLUTICASONE (FLONASE) 50 MCG/ACT NASAL SPRAY    1 spray by Each Nostril route daily       DISCONTINUED MEDICATIONS:  Discontinued Medications    No medications on file              (Please note that portions ofthis note were completed with a voice recognition program.  Efforts were made to edit the dictations but occasionally words are mis-transcribed.)    Nadira Fierro PA-C (electronically signed)           Laura Hickman PA-C  12/09/21 [id number]

## 2021-12-09 NOTE — ED NOTES
Discharge instructions given, pt verbalized understanding. Discussed follow-up appointments and medications. No questions or concerns at this time. Pt ambulated independently out of ER. Pt discharged with 3 prescriptions.       Fernando Sarkar RN  12/09/21 3660

## 2022-06-08 ENCOUNTER — HOSPITAL ENCOUNTER (EMERGENCY)
Age: 37
Discharge: HOME | End: 2022-06-08
Attending: STUDENT IN AN ORGANIZED HEALTH CARE EDUCATION/TRAINING PROGRAM
Payer: COMMERCIAL

## 2022-06-08 VITALS
BODY MASS INDEX: 25.84 KG/M2 | OXYGEN SATURATION: 95 % | RESPIRATION RATE: 19 BRPM | TEMPERATURE: 97.9 F | HEART RATE: 95 BPM | SYSTOLIC BLOOD PRESSURE: 132 MMHG | HEIGHT: 73 IN | WEIGHT: 195 LBS | DIASTOLIC BLOOD PRESSURE: 85 MMHG

## 2022-06-08 DIAGNOSIS — Z00.8 ENCOUNTER FOR PSYCHOLOGICAL EVALUATION: Primary | ICD-10-CM

## 2022-06-08 LAB
A/G RATIO: 1.9 (ref 1.1–2.2)
ACETAMINOPHEN LEVEL: <5 UG/ML (ref 10–30)
ALBUMIN SERPL-MCNC: 5 G/DL (ref 3.4–5)
ALP BLD-CCNC: 87 U/L (ref 40–129)
ALT SERPL-CCNC: 19 U/L (ref 10–40)
AMPHETAMINE SCREEN, URINE: ABNORMAL
ANION GAP SERPL CALCULATED.3IONS-SCNC: 11 MMOL/L (ref 3–16)
AST SERPL-CCNC: 21 U/L (ref 15–37)
BARBITURATE SCREEN URINE: ABNORMAL
BASOPHILS ABSOLUTE: 0 K/UL (ref 0–0.2)
BASOPHILS RELATIVE PERCENT: 0.6 %
BENZODIAZEPINE SCREEN, URINE: ABNORMAL
BILIRUB SERPL-MCNC: 0.5 MG/DL (ref 0–1)
BILIRUBIN URINE: NEGATIVE
BLOOD, URINE: NEGATIVE
BUN BLDV-MCNC: 11 MG/DL (ref 7–20)
CALCIUM SERPL-MCNC: 9.5 MG/DL (ref 8.3–10.6)
CANNABINOID SCREEN URINE: POSITIVE
CHLORIDE BLD-SCNC: 104 MMOL/L (ref 99–110)
CLARITY: CLEAR
CO2: 24 MMOL/L (ref 21–32)
COCAINE METABOLITE SCREEN URINE: ABNORMAL
COLOR: YELLOW
CREAT SERPL-MCNC: 0.8 MG/DL (ref 0.9–1.3)
EOSINOPHILS ABSOLUTE: 0.3 K/UL (ref 0–0.6)
EOSINOPHILS RELATIVE PERCENT: 3.7 %
ETHANOL: NORMAL MG/DL (ref 0–0.08)
GFR AFRICAN AMERICAN: >60
GFR NON-AFRICAN AMERICAN: >60
GLUCOSE BLD-MCNC: 127 MG/DL (ref 70–99)
GLUCOSE URINE: NEGATIVE MG/DL
HCT VFR BLD CALC: 41.4 % (ref 40.5–52.5)
HEMOGLOBIN: 14.3 G/DL (ref 13.5–17.5)
INFLUENZA A: NOT DETECTED
INFLUENZA B: NOT DETECTED
KETONES, URINE: NEGATIVE MG/DL
LEUKOCYTE ESTERASE, URINE: NEGATIVE
LYMPHOCYTES ABSOLUTE: 2.1 K/UL (ref 1–5.1)
LYMPHOCYTES RELATIVE PERCENT: 26.8 %
Lab: ABNORMAL
MCH RBC QN AUTO: 30.3 PG (ref 26–34)
MCHC RBC AUTO-ENTMCNC: 34.4 G/DL (ref 31–36)
MCV RBC AUTO: 87.9 FL (ref 80–100)
METHADONE SCREEN, URINE: ABNORMAL
MICROSCOPIC EXAMINATION: NORMAL
MONOCYTES ABSOLUTE: 0.5 K/UL (ref 0–1.3)
MONOCYTES RELATIVE PERCENT: 6.8 %
NEUTROPHILS ABSOLUTE: 4.8 K/UL (ref 1.7–7.7)
NEUTROPHILS RELATIVE PERCENT: 62.1 %
NITRITE, URINE: NEGATIVE
OPIATE SCREEN URINE: ABNORMAL
OXYCODONE URINE: ABNORMAL
PDW BLD-RTO: 13.3 % (ref 12.4–15.4)
PH UA: 6
PH UA: 6 (ref 5–8)
PHENCYCLIDINE SCREEN URINE: ABNORMAL
PLATELET # BLD: 284 K/UL (ref 135–450)
PMV BLD AUTO: 7.4 FL (ref 5–10.5)
POTASSIUM REFLEX MAGNESIUM: 3.9 MMOL/L (ref 3.5–5.1)
PROPOXYPHENE SCREEN: ABNORMAL
PROTEIN UA: NEGATIVE MG/DL
RBC # BLD: 4.71 M/UL (ref 4.2–5.9)
SALICYLATE, SERUM: <0.3 MG/DL (ref 15–30)
SARS-COV-2 RNA, RT PCR: NOT DETECTED
SODIUM BLD-SCNC: 139 MMOL/L (ref 136–145)
SPECIFIC GRAVITY UA: 1.02 (ref 1–1.03)
TOTAL PROTEIN: 7.7 G/DL (ref 6.4–8.2)
URINE REFLEX TO CULTURE: NORMAL
URINE TYPE: NORMAL
UROBILINOGEN, URINE: 0.2 E.U./DL
WBC # BLD: 7.7 K/UL (ref 4–11)

## 2022-06-08 PROCEDURE — 80053 COMPREHEN METABOLIC PANEL: CPT

## 2022-06-08 PROCEDURE — 99283 EMERGENCY DEPT VISIT LOW MDM: CPT

## 2022-06-08 PROCEDURE — 85025 COMPLETE CBC W/AUTO DIFF WBC: CPT

## 2022-06-08 PROCEDURE — 80179 DRUG ASSAY SALICYLATE: CPT

## 2022-06-08 PROCEDURE — 82077 ASSAY SPEC XCP UR&BREATH IA: CPT

## 2022-06-08 PROCEDURE — 80307 DRUG TEST PRSMV CHEM ANLYZR: CPT

## 2022-06-08 PROCEDURE — 81003 URINALYSIS AUTO W/O SCOPE: CPT

## 2022-06-08 PROCEDURE — 80143 DRUG ASSAY ACETAMINOPHEN: CPT

## 2022-06-08 PROCEDURE — 87636 SARSCOV2 & INF A&B AMP PRB: CPT

## 2022-06-08 PROCEDURE — 36415 COLL VENOUS BLD VENIPUNCTURE: CPT

## 2022-06-08 ASSESSMENT — LIFESTYLE VARIABLES
HOW MANY STANDARD DRINKS CONTAINING ALCOHOL DO YOU HAVE ON A TYPICAL DAY: 1 OR 2
HOW OFTEN DO YOU HAVE A DRINK CONTAINING ALCOHOL: MONTHLY OR LESS
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
## 2022-06-08 NOTE — ED PROVIDER NOTES
Primary Care Physician: No primary care provider on file. Attending Physician: Tushar Avalos MD     History   Chief Complaint   Patient presents with    Psychiatric Evaluation     Patient states he has been having a hard time since coming off drugs and after his brother passing away. Patient denies SI/HI. HPI   Matthew Carver  is a 40 y.o. male with history of schizophrenia who presents brought by EMS stating that he wants to talk to somebody. Patient is currently not on any medicines. He stated he is going on in his mind. He stated that he has auditory hallucinations without visual hallucinations. He denies any suicidal homicidal ideation. Stated that he is not on drugs now for the past few months. Denies any fevers chills nausea vomiting chest pain shortness of breath. Past Medical History:   Diagnosis Date    Hepatitis C 06/17/2020    Liver cirrhosis ([facility] [address] [facility]         Past Surgical History:   Procedure Laterality Date    APPENDECTOMY      HERNIA REPAIR          History reviewed. No pertinent family history.      Social History     Socioeconomic History    Marital status: Single     Spouse name: None    Number of children: None    Years of education: None    Highest education level: None   Occupational History    None   Tobacco Use    Smoking status: Current Every Day Smoker     Packs/day: 1.00     Types: Cigarettes    Smokeless tobacco: Never Used   Vaping Use    Vaping Use: Every day    Substances: Nicotine, Flavoring    Devices: Disposable   Substance and Sexual Activity    Alcohol use: Yes     Comment: whiskey not every day    Drug use: Yes     Types: Marijuana Zollie Axe)    Sexual activity: Yes   Other Topics Concern    None   Social History Narrative    None     Social Determinants of Health     Financial Resource Strain:     Difficulty of Paying Living Expenses: Not on file   Food Insecurity:     Worried About Running Out of Food in the Last Year: Not on file    Mariana of Food in the Last Year: Not on file   Transportation Needs:     Lack of Transportation (Medical): Not on file    Lack of Transportation (Non-Medical): Not on file   Physical Activity:     Days of Exercise per Week: Not on file    Minutes of Exercise per Session: Not on file   Stress:     Feeling of Stress : Not on file   Social Connections:     Frequency of Communication with Friends and Family: Not on file    Frequency of Social Gatherings with Friends and Family: Not on file    Attends Alevism Services: Not on file    Active Member of [address] [place] [address] or Organizations: Not on file    Attends Club or Organization Meetings: Not on file    Marital Status: Not on file   Intimate Partner Violence:     Fear of Current or Ex-Partner: Not on file    Emotionally Abused: Not on file    Physically Abused: Not on file    Sexually Abused: Not on file   Housing Stability:     Unable to Pay for Housing in the Last Year: Not on file    Number of [place] in the Last Year: Not on file    Unstable Housing in the Last Year: Not on file        Review of Systems   10 total systems reviewed and found to be negative unless otherwise noted in HPI     Physical Exam   /85   Pulse 95   Temp 97.9 °F (36.6 °C)   Resp 19   Ht 6' 1\" (1.854 m)   Wt 195 lb (88.5 kg)   SpO2 95%   BMI 25.73 kg/m²      CONSTITUTIONAL: Well appearing, in no acute distress   HEAD: atraumatic, normocephalic   EYES: PERRL, No injection, discharge or scleral icterus. ENT: Moist mucous membranes. NECK: Normal ROM, NO LAD   CARDIOVASCULAR: Regular rate and rhythm. No murmurs or gallop. PULMONARY/CHEST: Airway patent. No retractions. Breath sounds clear with good air entry bilaterally. ABDOMEN: Soft, Non-distended and non-tender, without guarding or rebound. SKIN: Acyanotic, warm, dry   MUSCULOSKELETAL: No swelling, tenderness or deformity   NEUROLOGICAL: Awake and oriented x 3. Pulses intact.  Grossly nonfocal   Nursing note and vitals reviewed. ED Course & Medical Decision Making   Medications - No data to display   Labs Reviewed   COMPREHENSIVE METABOLIC PANEL W/ REFLEX TO MG FOR LOW K - Abnormal; Notable for the following components:       Result Value    Glucose 127 (*)     CREATININE 0.8 (*)     All other components within normal limits   URINE DRUG SCREEN - Abnormal; Notable for the following components:    Cannabinoid Scrn, Ur POSITIVE (*)     All other components within normal limits   ACETAMINOPHEN LEVEL - Abnormal; Notable for the following components:    Acetaminophen Level <5 (*)     All other components within normal limits   SALICYLATE LEVEL - Abnormal; Notable for the following components:    Salicylate, Serum <9.7 (*)     All other components within normal limits   COVID-19 & INFLUENZA COMBO   CBC WITH AUTO DIFFERENTIAL   ETHANOL   URINALYSIS WITH REFLEX TO CULTURE      No orders to display        PROCEDURES:   Procedures    ASSESSMENT AND PLAN:  [id number], Oneyda Gilliam is a 40 y.o. male who presents questing for psychiatry evaluation. On exam patient appears nontoxic in no acute distress was very cooperative. Labs obtained so far with no abnormal findings. He is positive for marijuana which is what he admitted to doing smoking. At this point I do not think that his symptoms are organic. Patient is medically cleared pending psych evaluation and recommendations. ClINICAL IMPRESSION:  1. Encounter for psychological evaluation        DISPOSITION    Pending recommendation from psychiatry. ___________________________________________________________________________________  _________________________________________________________________________________________  This record is transcribed utilizing voice recognition technology. There are inherent limitations in this technology. In addition, there may be limitations in editing of this report. If there are any discrepancies, please contact me directly. Reji Clark MD  06/08/22 1780

## 2022-06-08 NOTE — ED NOTES
To [facility] [address] [facility] [address] [facility]. Cooperative with belongings exchange, and willing to provide urine. Belongings placed in locker, and pt placed in treatment room B4. Will continue to monitor.       Lilia Mcclain  06/08/22 8975

## 2022-06-08 NOTE — ED NOTES
Discharge instructions reviewed and belongings returned. Pt verbalized his understanding. Pt stated his girlfriend, Annabel Rivas, was picking him up. Pt thanked staff as he exited the [facility].       Merlyn Salamanca RN  06/08/22 9728
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
## 2022-06-08 NOTE — DISCHARGE INSTR - COC
Continuity of Care Form    Patient Name: Deanne Ellis   :  1985  MRN:  [id number]    Admit date:  2022  Discharge date:  ***    Code Status Order: Prior   Advance Directives:      Admitting Physician:  No admitting provider for patient encounter. PCP: No primary care provider on file. Discharging Nurse: [facility] Unit/Room#: B4/B4  Discharging Unit Phone Number: ***    Emergency Contact:   Extended Emergency Contact Information  Primary Emergency Contact: Kayla Suarez Phone: [phone]  Relation: Aunt/Uncle    Past Surgical History:  Past Surgical History:   Procedure Laterality Date    APPENDECTOMY      HERNIA REPAIR         Immunization History: There is no immunization history on file for this patient.     Active Problems:  Patient Active Problem List   Diagnosis Code    Abdominal pain R10.9    Transaminitis R74.01    Hyperbilirubinemia E80.6    Tobacco abuse Z72.0    Hx of hepatitis C Z86.19    Hyperglycemia R73.9    ETOH abuse F10.10    Viral hepatitis without hepatic coma B19.9       Isolation/Infection:   Isolation            No Isolation          Patient Infection Status       Infection Onset Added Last Indicated Last Indicated By Review Planned Expiration Resolved Resolved By    None active    Resolved    COVID-19 (Rule Out) 22 COVID-19 & Influenza Combo (Ordered)   22 Rule-Out Test Resulted    COVID-19 (Rule Out) 21 COVID-19 & Influenza Combo (Ordered)   21 Rule-Out Test Resulted    COVID-19 (Rule Out) 21 COVID-19, Rapid (Ordered)   21 Rule-Out Test Resulted            Nurse Assessment:  Last Vital Signs: /85   Pulse 95   Temp 97.9 °F (36.6 °C)   Resp 19   Ht 6' 1\" (1.854 m)   Wt 195 lb (88.5 kg)   SpO2 95%   BMI 25.73 kg/m²     Last documented pain score (0-10 scale):    Last Weight:   Wt Readings from Last 1 Encounters:   22 195 lb (88.5 kg)     Mental Status:  {IP PT MENTAL STATUS:}    IV Access:  { TANNER IV ACCESS:845333499}    Nursing Mobility/ADLs:  Walking   {CHP DME AZQJ:017557279}  Transfer  {CHP DME DGLT:479944921}  Bathing  {CHP DME JPF}  Dressing  {CHP DME VNNZ:740235353}  Toileting  {CHP DME EGKF:834414963}  Feeding  {[facility] DME FWCY:401276063}  Med Admin  {P DME BOQC:574679085}  Med Delivery   { TANNER MED Delivery:946643594}    Wound Care Documentation and Therapy:        Elimination:  Continence: Bowel: {YES / KE:68770}  Bladder: {YES / IN:27513}  Urinary Catheter: {Urinary Catheter:615026876}   Colostomy/Ileostomy/Ileal Conduit: {YES / EY:10135}       Date of Last BM: ***  No intake or output data in the 24 hours ending 22 1129  No intake/output data recorded.     Safety Concerns:     [employer] Safety Concerns:559544090}    Impairments/Disabilities:      [employer] Impairments/Disabilities:030943869}    Nutrition Therapy:  Current Nutrition Therapy:   [employer] Diet List:344621413}    Routes of Feeding: {[facility] DME Other Feedings:632049927}  Liquids: {Slp liquid thickness:36662}  Daily Fluid Restriction: {CHP DME Yes amt example:736764519}  Last Modified Barium Swallow with Video (Video Swallowing Test): {Done Not Done EKJK:241003353}    Treatments at the Time of Hospital Discharge:   Respiratory Treatments: ***  Oxygen Therapy:  {Therapy; copd oxygen:79816}  Ventilator:    {[facility] Vent CLZ}    Rehab Therapies: {THERAPEUTIC INTERVENTION:9582912486}  Weight Bearing Status/Restrictions: [employer] Weight Bearin}  Other Medical Equipment (for information only, NOT a DME order):  {EQUIPMENT:484516954}  Other Treatments: ***    Patient's personal belongings (please select all that are sent with patient):  {[facility] DME Belongings:669987887}    RN SIGNATURE:  {Esignature:984257088}    CASE MANAGEMENT/SOCIAL WORK SECTION    Inpatient Status Date: ***    Readmission Risk Assessment Score:  Readmission Risk              Risk of Unplanned Readmission:  0           Discharging to Facility/ Agency   Name:   Address:  Phone:  Fax:    Dialysis Facility (if applicable)   Name:  Address:  Dialysis Schedule:  Phone:  Fax:    / signature: {Esignature:349900792}    PHYSICIAN SECTION    Prognosis: {Prognosis:6716098944}    Condition at Discharge: Janki8 Sarai Claros Patient Condition:971781631}    Rehab Potential (if transferring to Rehab): {Prognosis:3463666358}    Recommended Labs or Other Treatments After Discharge: ***    Physician Certification: I certify the above information and transfer of Sammy White  is necessary for the continuing treatment of the diagnosis listed and that he requires {Admit to Appropriate Level of Care:28078} for {GREATER/LESS:279144064} 30 days.      Update Admission H&P: {CHP DME Changes in AXEXZ:799933207}    PHYSICIAN SIGNATURE:  {Esignature:078411705}

## 2022-06-08 NOTE — ED NOTES
Urine, blood, and COVID swab collected and sent to lab. Ice water provided.       Clarke Oreilly RN  06/08/22 4377

## 2023-11-09 ENCOUNTER — HOSPITAL ENCOUNTER (EMERGENCY)
Age: 38
Discharge: HOME | End: 2023-11-09
Payer: COMMERCIAL

## 2023-11-09 VITALS
RESPIRATION RATE: 16 BRPM | HEIGHT: 73 IN | TEMPERATURE: 97.5 F | BODY MASS INDEX: 24.52 KG/M2 | OXYGEN SATURATION: 100 % | SYSTOLIC BLOOD PRESSURE: 129 MMHG | HEART RATE: 69 BPM | WEIGHT: 185 LBS | DIASTOLIC BLOOD PRESSURE: 88 MMHG

## 2023-11-09 DIAGNOSIS — F19.90 POLYSUBSTANCE USE DISORDER: Primary | ICD-10-CM

## 2023-11-09 LAB
ALBUMIN SERPL-MCNC: 4.8 G/DL (ref 3.4–5)
ALBUMIN/GLOB SERPL: 1.9 {RATIO} (ref 1.1–2.2)
ALP SERPL-CCNC: 80 U/L (ref 40–129)
ALT SERPL-CCNC: 20 U/L (ref 10–40)
AMPHETAMINES UR QL SCN>1000 NG/ML: POSITIVE
ANION GAP SERPL CALCULATED.3IONS-SCNC: 11 MMOL/L (ref 3–16)
APAP SERPL-MCNC: <5 UG/ML (ref 10–30)
AST SERPL-CCNC: 28 U/L (ref 15–37)
BARBITURATES UR QL SCN>200 NG/ML: ABNORMAL
BASOPHILS # BLD: 0.1 K/UL (ref 0–0.2)
BASOPHILS NFR BLD: 0.5 %
BENZODIAZ UR QL SCN>200 NG/ML: POSITIVE
BILIRUB SERPL-MCNC: 0.4 MG/DL (ref 0–1)
BUN SERPL-MCNC: 13 MG/DL (ref 7–20)
CALCIUM SERPL-MCNC: 9.4 MG/DL (ref 8.3–10.6)
CANNABINOIDS UR QL SCN>50 NG/ML: POSITIVE
CHLORIDE SERPL-SCNC: 100 MMOL/L (ref 99–110)
CO2 SERPL-SCNC: 26 MMOL/L (ref 21–32)
COCAINE UR QL SCN: POSITIVE
CREAT SERPL-MCNC: 1.1 MG/DL (ref 0.9–1.3)
DEPRECATED RDW RBC AUTO: 12.6 % (ref 12.4–15.4)
DRUG SCREEN COMMENT UR-IMP: ABNORMAL
EOSINOPHIL # BLD: 0.4 K/UL (ref 0–0.6)
EOSINOPHIL NFR BLD: 3.1 %
ETHANOLAMINE SERPL-MCNC: NORMAL MG/DL (ref 0–0.08)
FENTANYL SCREEN, URINE: POSITIVE
GFR SERPLBLD CREATININE-BSD FMLA CKD-EPI: >60 ML/MIN/{1.73_M2}
GLUCOSE SERPL-MCNC: 97 MG/DL (ref 70–99)
HCT VFR BLD AUTO: 41.1 % (ref 40.5–52.5)
HGB BLD-MCNC: 13.9 G/DL (ref 13.5–17.5)
LYMPHOCYTES # BLD: 2.6 K/UL (ref 1–5.1)
LYMPHOCYTES NFR BLD: 21.5 %
MCH RBC QN AUTO: 29.5 PG (ref 26–34)
MCHC RBC AUTO-ENTMCNC: 33.9 G/DL (ref 31–36)
MCV RBC AUTO: 87.3 FL (ref 80–100)
METHADONE UR QL SCN>300 NG/ML: ABNORMAL
MONOCYTES # BLD: 1.1 K/UL (ref 0–1.3)
MONOCYTES NFR BLD: 8.8 %
NEUTROPHILS # BLD: 7.9 K/UL (ref 1.7–7.7)
NEUTROPHILS NFR BLD: 66.1 %
OPIATES UR QL SCN>300 NG/ML: ABNORMAL
OXYCODONE UR QL SCN: ABNORMAL
PCP UR QL SCN>25 NG/ML: ABNORMAL
PH UR STRIP: 6 [PH]
PLATELET # BLD AUTO: 320 K/UL (ref 135–450)
PMV BLD AUTO: 8.2 FL (ref 5–10.5)
POTASSIUM SERPL-SCNC: 3.9 MMOL/L (ref 3.5–5.1)
PROT SERPL-MCNC: 7.3 G/DL (ref 6.4–8.2)
RBC # BLD AUTO: 4.7 M/UL (ref 4.2–5.9)
SALICYLATES SERPL-MCNC: <0.3 MG/DL (ref 15–30)
SODIUM SERPL-SCNC: 137 MMOL/L (ref 136–145)
WBC # BLD AUTO: 12 K/UL (ref 4–11)

## 2023-11-09 PROCEDURE — 85025 COMPLETE CBC W/AUTO DIFF WBC: CPT

## 2023-11-09 PROCEDURE — 99285 EMERGENCY DEPT VISIT HI MDM: CPT

## 2023-11-09 PROCEDURE — 82077 ASSAY SPEC XCP UR&BREATH IA: CPT

## 2023-11-09 PROCEDURE — 80053 COMPREHEN METABOLIC PANEL: CPT

## 2023-11-09 PROCEDURE — 80307 DRUG TEST PRSMV CHEM ANLYZR: CPT

## 2023-11-09 PROCEDURE — 36415 COLL VENOUS BLD VENIPUNCTURE: CPT

## 2023-11-09 PROCEDURE — 80179 DRUG ASSAY SALICYLATE: CPT

## 2023-11-09 PROCEDURE — 80143 DRUG ASSAY ACETAMINOPHEN: CPT

## 2023-11-09 ASSESSMENT — ENCOUNTER SYMPTOMS
BACK PAIN: 0
SHORTNESS OF BREATH: 0
ABDOMINAL PAIN: 0
NAUSEA: 0
VOMITING: 0

## 2023-11-09 ASSESSMENT — PAIN - FUNCTIONAL ASSESSMENT: PAIN_FUNCTIONAL_ASSESSMENT: NONE - DENIES PAIN

## 2023-11-09 NOTE — ED PROVIDER NOTES
[address] [address] ED  EMERGENCY DEPARTMENT ENCOUNTER        Pt Name: Darylene Cliff  MRN: [id number]  [facility] 1985  Date of evaluation: 11/9/2023  Provider: KATELYNN Lehman CNP  PCP: No primary care provider on file. Note Started: 4:57 PM EST 11/9/23      JOSE LUIS. I have evaluated this patient. CHIEF COMPLAINT       Chief Complaint   Patient presents with    Psychiatric Evaluation     Pt brought in on hold by police. Pt is denying being suicidal.  Pt states he has drug induced paranoia. Does ice. Says he says stupid things sometimes but doesn't mean it and doesn't have the guts to do anything. HISTORY OF PRESENT ILLNESS: 1 or more Elements     History From: Patient    Limitations to history : None    Social Determinants Significantly Affecting Health : None    Chief Complaint: Encounter for psychiatric evaluation for hearing voices and verbalizations of suicide. Darylene Cliff is a 41 y.o. male who presents to the emergency department today after he was brought in by police on a psychiatric hold. The reported that he has been paranoid. He verbalized some suicidal thoughts. Ultimately he was brought here for psychiatric evaluation. Patient states that he has been evaluated for this before and states that it is usually drug-induced. He states he uses ice. He denies any suicidal thoughts at this time. He is awake alert and oriented. He denies any physical complaints. Nursing Notes were all reviewed and agreed with or any disagreements were addressed in the HPI. REVIEW OF SYSTEMS :      Review of Systems   Constitutional:  Negative for fever. Respiratory:  Negative for shortness of breath. Cardiovascular:  Negative for chest pain. Gastrointestinal:  Negative for abdominal pain, nausea and vomiting. Genitourinary:  Negative for flank pain. Musculoskeletal:  Negative for back pain. Skin:  Negative for wound. Neurological:  Negative for headaches.

## 2023-11-10 NOTE — ED NOTES
Collateral Contact:  Yair Dempsey  LDQ:[phone]  Relation to Patient: Girlfriend/fiance  Last Contact with Patient: today  Concerns: Tamica Rogers reported that pt is using drugs and seeing/hearing things. Tamica Rogers reported that pt will try to fight invisible people or has threatened to kill himself while he's high. Tamica Rogers reported that he is not able to come back to their home.        Mena Little, [address]     Mena Little, [address]  11/09/23 2002
Level of Care Disposition: Discharge          Patient was seen by ED provider and SW/Nursing  staff. The case was presented to psychiatric provider on-call ELMER Guzman. Based on the ED evaluation and information presented to the provider by [facility] , it is the recommendation of the on call psychiatric provider that the patient be discharged from a psychiatric standpoint with the following referrals: community resources, substance abuse.          [facility]  11/09/23 2000
behaviors     []  Not attending to self-care/ADLs     []  Recent significant loss     []  Chronic pain or medical illness    []  Social isolation     []  History of violence    [x]  Active psychosis    []  Cognitive impairment     []  No outpatient services in place    []  Medication noncompliance    []  No collateral information to support safety   [] Self- injurious/ Self-harm behavior     PROTECTIVE FACTORS:  [x] Age >25 and <55  [] Female gender   [x] Denies depression    [x] Denies suicidal ideation   [x] Does not have lethal plan    [x] Does not have access to guns or weapons   [x] No prior suicide attempts   [] No active substance abuse    [] Patient has social or family support    [] No active psychosis or cognitive dysfunction   [x] Physically healthy   [] Has outpatient services in place    [] Compliant with recommended medications      Elly Ramos, [address], [place], [facility]  11/09/23 1959

## 2023-11-10 NOTE — DISCHARGE INSTRUCTIONS
as substance abuse and other forms of counseling. Substance Abuse Treatment Resources    Detox Only- (Treatment should be coordinated prior)    [address] [place], [address] [facility] [address]    Detox Included in [address]  [address] Medicaid, will take [facility]. residents on a fee-only basis if no insurance  Inpatient detox for men and women  [facility] [phone]  ext. [address] [facility]  Accepts Medicaid  Inpatient detox for men and women  [place]- [phone]  [facility]. [facility]  Private and Medicaid  Ambulatory Detox for men          The [address] [place] [address]- [address]  Private Insurance only  Inpatient detox  [place]- [phone]  [place], [address]  Private insurance only, although will accept those with Medicaid aged 18-20  Inpatient detox  [facility]- [phone], [phone] Admissions  [address] [facility],[address] [place], [address]  Most forms of Medicaid accepted, plus private insurances  Inpatient detox  The [address] [place] [address]- [phone] or [address]  Most insurance accepted and self-pay rates available  Inpatient detox  [facility]-[phone]  [facility]  Medicaid accepted  Dual diagnosis treatment with medication management  Pregnant women accepted  Provides transportation to facility   Inpatient detox    [facility] [address]- [phone] [address] [facility], [address]  Accepts Medicaid and other forms of insurance  [facility] [address]  13[address] [facility], [facility], [address] [place] [address],[address]  Accepts Medicaid and other forms of insurance  [facility] [address]- [phone]  [place].  Luz Mendes, [address] [facility]  Accepts Medicaid and other

## 2023-11-29 ENCOUNTER — HOSPITAL ENCOUNTER (INPATIENT)
Age: 38
LOS: 2 days | Discharge: HOME | End: 2023-12-01
Attending: EMERGENCY MEDICINE | Admitting: PSYCHIATRY & NEUROLOGY
Payer: COMMERCIAL

## 2023-11-29 DIAGNOSIS — F22 PARANOIA (PSYCHOSIS) (HCC): ICD-10-CM

## 2023-11-29 DIAGNOSIS — F19.20 DRUG ABUSE AND DEPENDENCE (HCC): ICD-10-CM

## 2023-11-29 DIAGNOSIS — R45.851 SUICIDAL IDEATION: Primary | ICD-10-CM

## 2023-11-29 DIAGNOSIS — T14.91XA SUICIDE ATTEMPT (HCC): ICD-10-CM

## 2023-11-29 LAB
ALBUMIN SERPL-MCNC: 4.6 G/DL (ref 3.4–5)
ALBUMIN/GLOB SERPL: 1.5 {RATIO} (ref 1.1–2.2)
ALP SERPL-CCNC: 83 U/L (ref 40–129)
ALT SERPL-CCNC: 25 U/L (ref 10–40)
AMPHETAMINES UR QL SCN>1000 NG/ML: POSITIVE
ANION GAP SERPL CALCULATED.3IONS-SCNC: 8 MMOL/L (ref 3–16)
APAP SERPL-MCNC: <5 UG/ML (ref 10–30)
AST SERPL-CCNC: 32 U/L (ref 15–37)
BARBITURATES UR QL SCN>200 NG/ML: ABNORMAL
BASOPHILS # BLD: 0.1 K/UL (ref 0–0.2)
BASOPHILS NFR BLD: 0.8 %
BENZODIAZ UR QL SCN>200 NG/ML: ABNORMAL
BILIRUB SERPL-MCNC: 0.4 MG/DL (ref 0–1)
BILIRUB UR QL STRIP.AUTO: NEGATIVE
BUN SERPL-MCNC: 13 MG/DL (ref 7–20)
CALCIUM SERPL-MCNC: 9.2 MG/DL (ref 8.3–10.6)
CANNABINOIDS UR QL SCN>50 NG/ML: POSITIVE
CHLORIDE SERPL-SCNC: 101 MMOL/L (ref 99–110)
CLARITY UR: CLEAR
CO2 SERPL-SCNC: 26 MMOL/L (ref 21–32)
COCAINE UR QL SCN: POSITIVE
COLOR UR: YELLOW
CREAT SERPL-MCNC: 1 MG/DL (ref 0.9–1.3)
DEPRECATED RDW RBC AUTO: 13.1 % (ref 12.4–15.4)
DRUG SCREEN COMMENT UR-IMP: ABNORMAL
EOSINOPHIL # BLD: 0.2 K/UL (ref 0–0.6)
EOSINOPHIL NFR BLD: 2.6 %
EPI CELLS #/AREA URNS HPF: ABNORMAL /HPF (ref 0–5)
ETHANOLAMINE SERPL-MCNC: NORMAL MG/DL (ref 0–0.08)
FENTANYL SCREEN, URINE: POSITIVE
GFR SERPLBLD CREATININE-BSD FMLA CKD-EPI: >60 ML/MIN/{1.73_M2}
GLUCOSE SERPL-MCNC: 91 MG/DL (ref 70–99)
GLUCOSE UR STRIP.AUTO-MCNC: NEGATIVE MG/DL
HCT VFR BLD AUTO: 40.9 % (ref 40.5–52.5)
HGB BLD-MCNC: 14 G/DL (ref 13.5–17.5)
HGB UR QL STRIP.AUTO: ABNORMAL
KETONES UR STRIP.AUTO-MCNC: NEGATIVE MG/DL
LEUKOCYTE ESTERASE UR QL STRIP.AUTO: NEGATIVE
LYMPHOCYTES # BLD: 2.4 K/UL (ref 1–5.1)
LYMPHOCYTES NFR BLD: 29 %
MCH RBC QN AUTO: 30.2 PG (ref 26–34)
MCHC RBC AUTO-ENTMCNC: 34.2 G/DL (ref 31–36)
MCV RBC AUTO: 88.2 FL (ref 80–100)
METHADONE UR QL SCN>300 NG/ML: ABNORMAL
MONOCYTES # BLD: 0.6 K/UL (ref 0–1.3)
MONOCYTES NFR BLD: 7 %
NEUTROPHILS # BLD: 5.1 K/UL (ref 1.7–7.7)
NEUTROPHILS NFR BLD: 60.6 %
NITRITE UR QL STRIP.AUTO: NEGATIVE
OPIATES UR QL SCN>300 NG/ML: ABNORMAL
OXYCODONE UR QL SCN: ABNORMAL
PCP UR QL SCN>25 NG/ML: ABNORMAL
PH UR STRIP.AUTO: 6.5 [PH] (ref 5–8)
PH UR STRIP: 6.5 [PH]
PLATELET # BLD AUTO: 336 K/UL (ref 135–450)
PMV BLD AUTO: 7.7 FL (ref 5–10.5)
POTASSIUM SERPL-SCNC: 3.9 MMOL/L (ref 3.5–5.1)
PROT SERPL-MCNC: 7.7 G/DL (ref 6.4–8.2)
PROT UR STRIP.AUTO-MCNC: NEGATIVE MG/DL
RBC # BLD AUTO: 4.63 M/UL (ref 4.2–5.9)
RBC #/AREA URNS HPF: ABNORMAL /HPF (ref 0–4)
SALICYLATES SERPL-MCNC: <0.3 MG/DL (ref 15–30)
SODIUM SERPL-SCNC: 135 MMOL/L (ref 136–145)
SP GR UR STRIP.AUTO: 1.02 (ref 1–1.03)
UA COMPLETE W REFLEX CULTURE PNL UR: ABNORMAL
UA DIPSTICK W REFLEX MICRO PNL UR: YES
URN SPEC COLLECT METH UR: ABNORMAL
UROBILINOGEN UR STRIP-ACNC: 4 E.U./DL
WBC # BLD AUTO: 8.4 K/UL (ref 4–11)
WBC #/AREA URNS HPF: ABNORMAL /HPF (ref 0–5)

## 2023-11-29 PROCEDURE — 80143 DRUG ASSAY ACETAMINOPHEN: CPT

## 2023-11-29 PROCEDURE — 80179 DRUG ASSAY SALICYLATE: CPT

## 2023-11-29 PROCEDURE — 1240000000 HC EMOTIONAL WELLNESS R&B

## 2023-11-29 PROCEDURE — 80053 COMPREHEN METABOLIC PANEL: CPT

## 2023-11-29 PROCEDURE — 99285 EMERGENCY DEPT VISIT HI MDM: CPT

## 2023-11-29 PROCEDURE — 81001 URINALYSIS AUTO W/SCOPE: CPT

## 2023-11-29 PROCEDURE — 85025 COMPLETE CBC W/AUTO DIFF WBC: CPT

## 2023-11-29 PROCEDURE — 6370000000 HC RX 637 (ALT 250 FOR IP)

## 2023-11-29 PROCEDURE — 84443 ASSAY THYROID STIM HORMONE: CPT

## 2023-11-29 PROCEDURE — 82077 ASSAY SPEC XCP UR&BREATH IA: CPT

## 2023-11-29 PROCEDURE — 80307 DRUG TEST PRSMV CHEM ANLYZR: CPT

## 2023-11-29 PROCEDURE — 80061 LIPID PANEL: CPT

## 2023-11-29 PROCEDURE — 83036 HEMOGLOBIN GLYCOSYLATED A1C: CPT

## 2023-11-29 PROCEDURE — 36415 COLL VENOUS BLD VENIPUNCTURE: CPT

## 2023-11-29 RX ORDER — OLANZAPINE 10 MG/1
10 TABLET ORAL EVERY 4 HOURS PRN
Status: DISCONTINUED | OUTPATIENT
Start: 2023-11-29 | End: 2023-12-01 | Stop reason: HOSPADM

## 2023-11-29 RX ORDER — LORAZEPAM 2 MG/ML
2 INJECTION INTRAMUSCULAR ONCE
Status: DISCONTINUED | OUTPATIENT
Start: 2023-11-29 | End: 2023-11-29

## 2023-11-29 RX ORDER — MAGNESIUM HYDROXIDE/ALUMINUM HYDROXICE/SIMETHICONE 120; 1200; 1200 MG/30ML; MG/30ML; MG/30ML
30 SUSPENSION ORAL EVERY 6 HOURS PRN
Status: DISCONTINUED | OUTPATIENT
Start: 2023-11-29 | End: 2023-12-01 | Stop reason: HOSPADM

## 2023-11-29 RX ORDER — ACETAMINOPHEN 325 MG/1
650 TABLET ORAL EVERY 4 HOURS PRN
Status: DISCONTINUED | OUTPATIENT
Start: 2023-11-29 | End: 2023-12-01 | Stop reason: HOSPADM

## 2023-11-29 RX ORDER — NICOTINE 21 MG/24HR
1 PATCH, TRANSDERMAL 24 HOURS TRANSDERMAL DAILY
Status: DISCONTINUED | OUTPATIENT
Start: 2023-11-29 | End: 2023-12-01 | Stop reason: HOSPADM

## 2023-11-29 RX ORDER — IBUPROFEN 400 MG/1
400 TABLET ORAL EVERY 6 HOURS PRN
Status: DISCONTINUED | OUTPATIENT
Start: 2023-11-29 | End: 2023-12-01 | Stop reason: HOSPADM

## 2023-11-29 RX ORDER — NICOTINE 21 MG/24HR
1 PATCH, TRANSDERMAL 24 HOURS TRANSDERMAL DAILY
Status: DISCONTINUED | OUTPATIENT
Start: 2023-11-29 | End: 2023-11-29

## 2023-11-29 RX ORDER — NICOTINE 21 MG/24HR
PATCH, TRANSDERMAL 24 HOURS TRANSDERMAL
Status: COMPLETED
Start: 2023-11-29 | End: 2023-11-30

## 2023-11-29 RX ORDER — BENZTROPINE MESYLATE 1 MG/ML
2 INJECTION INTRAMUSCULAR; INTRAVENOUS 2 TIMES DAILY PRN
Status: DISCONTINUED | OUTPATIENT
Start: 2023-11-29 | End: 2023-12-01 | Stop reason: HOSPADM

## 2023-11-29 RX ORDER — TRAZODONE HYDROCHLORIDE 50 MG/1
50 TABLET ORAL NIGHTLY PRN
Status: DISCONTINUED | OUTPATIENT
Start: 2023-11-29 | End: 2023-12-01 | Stop reason: HOSPADM

## 2023-11-29 RX ORDER — HALOPERIDOL 5 MG/ML
5 INJECTION INTRAMUSCULAR ONCE
Status: DISCONTINUED | OUTPATIENT
Start: 2023-11-29 | End: 2023-11-29

## 2023-11-29 RX ORDER — POLYETHYLENE GLYCOL 3350 17 G
2 POWDER IN PACKET (EA) ORAL
Status: DISCONTINUED | OUTPATIENT
Start: 2023-11-29 | End: 2023-12-01 | Stop reason: HOSPADM

## 2023-11-29 ASSESSMENT — PATIENT HEALTH QUESTIONNAIRE - PHQ9
SUM OF ALL RESPONSES TO PHQ QUESTIONS 1-9: 6
1. LITTLE INTEREST OR PLEASURE IN DOING THINGS: 0
SUM OF ALL RESPONSES TO PHQ QUESTIONS 1-9: 6
6. FEELING BAD ABOUT YOURSELF - OR THAT YOU ARE A FAILURE OR HAVE LET YOURSELF OR YOUR FAMILY DOWN: 3
2. FEELING DOWN, DEPRESSED OR HOPELESS: 3
4. FEELING TIRED OR HAVING LITTLE ENERGY: 0
SUM OF ALL RESPONSES TO PHQ QUESTIONS 1-9: 6
SUM OF ALL RESPONSES TO PHQ9 QUESTIONS 1 & 2: 3
3. TROUBLE FALLING OR STAYING ASLEEP: 0
5. POOR APPETITE OR OVEREATING: 0
SUM OF ALL RESPONSES TO PHQ QUESTIONS 1-9: 6
7. TROUBLE CONCENTRATING ON THINGS, SUCH AS READING THE NEWSPAPER OR WATCHING TELEVISION: 0
10. IF YOU CHECKED OFF ANY PROBLEMS, HOW DIFFICULT HAVE THESE PROBLEMS MADE IT FOR YOU TO DO YOUR WORK, TAKE CARE OF THINGS AT HOME, OR GET ALONG WITH OTHER PEOPLE: 1
8. MOVING OR SPEAKING SO SLOWLY THAT OTHER PEOPLE COULD HAVE NOTICED. OR THE OPPOSITE, BEING SO FIGETY OR RESTLESS THAT YOU HAVE BEEN MOVING AROUND A LOT MORE THAN USUAL: 0
9. THOUGHTS THAT YOU WOULD BE BETTER OFF DEAD, OR OF HURTING YOURSELF: 0

## 2023-11-29 ASSESSMENT — SLEEP AND FATIGUE QUESTIONNAIRES
AVERAGE NUMBER OF SLEEP HOURS: 5
DO YOU HAVE DIFFICULTY SLEEPING: NO
DO YOU USE A SLEEP AID: NO

## 2023-11-29 ASSESSMENT — PAIN - FUNCTIONAL ASSESSMENT: PAIN_FUNCTIONAL_ASSESSMENT: NONE - DENIES PAIN

## 2023-11-29 NOTE — ED NOTES
Collateral Contact:  Name: Sydnie Christian  Phone: [phone]  Relation to Patient: Girlfriend  Last Contact with Patient: Today, transported to the ED  Concerns: Sydnie Christian reports increase in drug use recently and reports that he intentionally overdosed 5 days ago. Sydnie Christian reported that she was worried he had , so she searched [employer] until she found him and brought him to the ED. Sydnie Christian reported that he has threatened suicide and has on-going psychosis. Sydnie Christian was unsure if this was drug related or mental health related.        Sydnie Christian is supportive of plan to admit [facility] [place]  [phone]

## 2023-11-29 NOTE — VIRTUAL HEALTH
Reason for Cancel: TelePsych Reason for Cancel: [facility] staff onsite     --FREDDY Malik on 11/29/2023 at 2:41 PM    An electronic signature was used to authenticate this note.
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
## 2023-11-29 NOTE — ED NOTES
Level of Care Disposition: Admit      Patient was seen by ED provider and SW/Nursing staff. The case presented to psychiatric provider on-call Dr. Shahab Dale. Based on the ED evaluation and information presented to the provider by [facility], it is the recommendation of the on call psychiatric provider that inpatient hospitalization is the least restrictive environment for the patient at this time. The patient will be admitted to the inpatient unit.        Insurance Pre certification Authorization: [facility]  11/29/23 1759

## 2023-11-29 NOTE — ED NOTES
Pt placed in safety gown and belongings placed below nurses' station.      Catherine Palma [facility]  11/29/23 0942

## 2023-11-29 NOTE — ED PROVIDER NOTES
ED Attending Attestation Note    This patient was seen by the advanced practice provider. I personally saw the patient and performed a substantive portion of the visit including all aspects of the medical decision making. Briefly, 41 y.o. male presents with concern for suicidal ideation as well as polysubstance abuse. .     Focused exam:   Gen: 41 y.o. male, NAD  ,, He is slightly agitated verbally but is able to be redirected. Patient states that he is very anxious. He is breathing comfortably though and speaking in full sentences. Slightly pressured speech noted. MDM:   I have performed a medical clearance examination on this patient. It is my opinion that no medical conditions were discovered that would preclude admission to a behavioral health unit or discharge home. I feel that the patient is medically stable for disposition by the behavioral health team at this time. For further details of the patient's emergency department visit, please see the advanced practice provider's documentation. Fabiola Ayoub MD     This report has been produced using speech recognition software and may contain errors related to that system including errors in grammar, punctuation, and spelling, as well as words and phrases that may be inappropriate. If there are any questions or concerns please feel free to contact the dictating provider for clarification.        Fabiola Ayoub MD  11/29/23 8454

## 2023-11-30 PROBLEM — F19.20 DRUG ABUSE AND DEPENDENCE (HCC): Status: ACTIVE | Noted: 2023-11-30

## 2023-11-30 PROBLEM — R45.851 SUICIDAL IDEATION: Status: ACTIVE | Noted: 2023-11-30

## 2023-11-30 LAB
CHOLEST SERPL-MCNC: 148 MG/DL (ref 0–199)
EKG ATRIAL RATE: 80 BPM
EKG DIAGNOSIS: NORMAL
EKG P AXIS: 57 DEGREES
EKG P-R INTERVAL: 130 MS
EKG Q-T INTERVAL: 352 MS
EKG QRS DURATION: 92 MS
EKG QTC CALCULATION (BAZETT): 405 MS
EKG R AXIS: 40 DEGREES
EKG T AXIS: 51 DEGREES
EKG VENTRICULAR RATE: 80 BPM
HDLC SERPL-MCNC: 41 MG/DL (ref 40–60)
LDLC SERPL CALC-MCNC: 84 MG/DL
TRIGL SERPL-MCNC: 113 MG/DL (ref 0–150)
TSH SERPL DL<=0.005 MIU/L-ACNC: 1.08 UIU/ML (ref 0.27–4.2)
VLDLC SERPL CALC-MCNC: 23 MG/DL

## 2023-11-30 PROCEDURE — 93005 ELECTROCARDIOGRAM TRACING: CPT | Performed by: PSYCHIATRY & NEUROLOGY

## 2023-11-30 PROCEDURE — 99223 1ST HOSP IP/OBS HIGH 75: CPT

## 2023-11-30 PROCEDURE — 93010 ELECTROCARDIOGRAM REPORT: CPT | Performed by: INTERNAL MEDICINE

## 2023-11-30 PROCEDURE — 99222 1ST HOSP IP/OBS MODERATE 55: CPT

## 2023-11-30 PROCEDURE — 6370000000 HC RX 637 (ALT 250 FOR IP)

## 2023-11-30 PROCEDURE — 1240000000 HC EMOTIONAL WELLNESS R&B

## 2023-11-30 RX ORDER — ARIPIPRAZOLE 10 MG/1
5 TABLET ORAL DAILY
Status: DISCONTINUED | OUTPATIENT
Start: 2023-11-30 | End: 2023-12-01 | Stop reason: HOSPADM

## 2023-11-30 RX ADMIN — ARIPIPRAZOLE 5 MG: 10 TABLET ORAL at 12:15

## 2023-11-30 ASSESSMENT — SLEEP AND FATIGUE QUESTIONNAIRES
AVERAGE NUMBER OF SLEEP HOURS: 6
DO YOU USE A SLEEP AID: NO
DO YOU HAVE DIFFICULTY SLEEPING: NO

## 2023-11-30 ASSESSMENT — PATIENT HEALTH QUESTIONNAIRE - PHQ9
1. LITTLE INTEREST OR PLEASURE IN DOING THINGS: 0
7. TROUBLE CONCENTRATING ON THINGS, SUCH AS READING THE NEWSPAPER OR WATCHING TELEVISION: 0
SUM OF ALL RESPONSES TO PHQ9 QUESTIONS 1 & 2: 2
SUM OF ALL RESPONSES TO PHQ QUESTIONS 1-9: 4
5. POOR APPETITE OR OVEREATING: 0
SUM OF ALL RESPONSES TO PHQ QUESTIONS 1-9: 4
3. TROUBLE FALLING OR STAYING ASLEEP: 0
9. THOUGHTS THAT YOU WOULD BE BETTER OFF DEAD, OR OF HURTING YOURSELF: 0
6. FEELING BAD ABOUT YOURSELF - OR THAT YOU ARE A FAILURE OR HAVE LET YOURSELF OR YOUR FAMILY DOWN: 2
SUM OF ALL RESPONSES TO PHQ QUESTIONS 1-9: 4
SUM OF ALL RESPONSES TO PHQ QUESTIONS 1-9: 4
8. MOVING OR SPEAKING SO SLOWLY THAT OTHER PEOPLE COULD HAVE NOTICED. OR THE OPPOSITE, BEING SO FIGETY OR RESTLESS THAT YOU HAVE BEEN MOVING AROUND A LOT MORE THAN USUAL: 0
4. FEELING TIRED OR HAVING LITTLE ENERGY: 0
10. IF YOU CHECKED OFF ANY PROBLEMS, HOW DIFFICULT HAVE THESE PROBLEMS MADE IT FOR YOU TO DO YOUR WORK, TAKE CARE OF THINGS AT HOME, OR GET ALONG WITH OTHER PEOPLE: 1
2. FEELING DOWN, DEPRESSED OR HOPELESS: 2

## 2023-11-30 NOTE — PROGRESS NOTES
Patient arrived on unit via wheelchair from Zone B in the ED. Patient calm and cooperative. Paperwork signed and vitals obtained. Patient given snacks and fluids. Patient in safety gown and belongings placed behind nurse station.
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
## 2023-11-30 NOTE — PLAN OF CARE
Problem: Psychosis  Goal: Will report no hallucinations or delusions  Description: INTERVENTIONS:  1. Administer medication as  ordered  2. Assist with reality testing to support increasing orientation  3. Assess if patient's hallucinations or delusions are encouraging self harm or harm to others and intervene as appropriate  Outcome: Not Progressing     Problem: Depression  Goal: Will be euthymic at discharge  Description: INTERVENTIONS:  1. Administer medication as ordered  2. Provide emotional support via 1:1 interaction with staff  3. Encourage involvement in milieu/groups/activities  4. Monitor for social isolation  Outcome: Progressing     Problem: Self Harm/Suicidality  Goal: Will have no self-injury during hospital stay  Description: INTERVENTIONS:  1. Ensure constant observer at bedside with Q15M safety checks  2. Maintain a safe environment  3. Secure patient belongings  4. Ensure family/visitors adhere to safety recommendations  5. Ensure safety tray has been added to patient's diet order  6. Every shift and PRN: Re-assess suicidal risk via Frequent Screener    Outcome: Progressing   Patient withdrawn to room. Patient denies SI/HI and pain. Patient reports voices. No concerns voiced  Problem: Psychosis  Goal: Will report no hallucinations or delusions  Description: INTERVENTIONS:  1. Administer medication as  ordered  2. Assist with reality testing to support increasing orientation  3.  Assess if patient's hallucinations or delusions are encouraging self harm or harm to others and intervene as appropriate  Outcome: Not Progressing

## 2023-11-30 NOTE — PROGRESS NOTES
Patient denies suicidal ideation upon arrival to unit. Patient reports that he could never harm or kill himself when he isn't using drugs. Patient reported that he was under the influence of drugs when he overdosed. Patient reports he wants help and feels safe on the unit. Patient scored low risk on CSSR. Patient 1:1 upon arrival with sitter with patient. Patient arrived on unit on constant observation and suicide precautions. Dr. Martin Martins notified and ok'd to discontinue suicide precautions and constant observation.

## 2023-11-30 NOTE — PROGRESS NOTES
Home Medication Reconciliation Status          [x] COMPLETE       Medication history has been reviewed and obtained from the following source(s):       [x] patient/family verbal report             [] patient/family provided written list       [] external pharmacy   [] external facility list         [x]  Provider notified that home medication reconciliation is complete          [] IN PROGRESS       Medication reconciliation marked in progress at this time due to:       [] patient/family poor historian      [] waiting arrival of family to clarify       [] waiting for accurate list        [] external pharmacy needs called      * Follow up is needed. [] UNABLE TO ASSESS       Medication reconciliation is incomplete and unable to assess at this time due to:       [] critical patient condition   [] patient is unresponsive        [] no family available                       [] unknown pharmacy       [] anonymous patient          * Follow up is needed.       [] Pharmacy consult placed for medication reconciliation assistance   Additional comments:  Patient reports no current medications

## 2023-11-30 NOTE — PROGRESS NOTES
Behavioral Services  Medicare Certification Upon Admission    I certify that this patient's inpatient psychiatric hospital admission is medically necessary for:    [x] (1) Treatment which could reasonably be expected to improve this patient's condition,       [x] (2) Or for diagnostic study;     AND     [x](2) The inpatient psychiatric services are provided while the individual is under the care of a physician and are included in the individualized plan of care.     Estimated length of stay/service 2-5 days    Plan for post-hospital care outpatient services    Electronically signed by KATELYNN Jay CNP on 11/30/2023 at 11:53 AM

## 2023-11-30 NOTE — PLAN OF CARE
Problem: Drug Abuse/Detox  Goal: Will have no detox symptoms and will verbalize plan for changing drug-related behavior  Description: INTERVENTIONS:  1. Administer medication as ordered  2. Monitor physical status  3. Provide emotional support with 1:1 interaction with staff  4. Encourage  recovery focused treatment   Outcome: Progressing     Problem: Self Harm/Suicidality  Goal: Will have no self-injury during hospital stay  Description: INTERVENTIONS:  1. Ensure constant observer at bedside with Q15M safety checks  2. Maintain a safe environment  3. Secure patient belongings  4. Ensure family/visitors adhere to safety recommendations  5. Ensure safety tray has been added to patient's diet order  6. Every shift and PRN: Re-assess suicidal risk via Frequent Screener    Outcome: Progressing     Problem: Psychosis  Goal: Will report no hallucinations or delusions  Description: INTERVENTIONS:  1. Administer medication as  ordered  2. Assist with reality testing to support increasing orientation  3. Assess if patient's hallucinations or delusions are encouraging self harm or harm to others and intervene as appropriate  Outcome: Not Progressing   Patient denies SI/HI, endorses hearing voices(conversations). Patient pleasant and cooperative during assessment and interview. Patient denies withdrawal symptoms. No concerns voiced at this time.

## 2023-11-30 NOTE — PROGRESS NOTES
[facility]  Admission Note     Admission Type:   Admission Type: Voluntary    Reason for admission:  Reason for Admission: Paranoia, Drug use, Suicidal Thoughts, recent drug overdose on heroin and methamphetamines      Addictive Behavior:   Addictive Behavior  In the Past 3 Months, Have You Felt or Has Someone Told You That You Have a Problem With  : None    Medical Problems:   Past Medical History:   Diagnosis Date    Hepatitis C 06/17/2020    Liver cirrhosis ([address])        Status EXAM:  Mental Status and Behavioral Exam  Normal: No  Level of Assistance: Independent/Self  Facial Expression: Worried, Flat  Affect: Constricted  Level of Consciousness: Alert  Frequency of Checks: 4 times per hour, close  Mood:Normal: No  Mood: Anxious, Sad  Motor Activity:Normal: Yes  Eye Contact: Good  Observed Behavior: Guarded, Cooperative, Friendly  Sexual Misconduct History: Current - no  Preception: [place] to person, [place] to time, [place] to place, [place] to situation  Attention:Normal: Yes  Thought Processes: Circumstantial  Thought Content:Normal: No  Thought Content: Poverty of content  Depression Symptoms: Appetite change, Change in energy level  Anxiety Symptoms: Generalized  Carli Symptoms: Pressured speech, Poor judgment  Hallucinations:  Auditory (comment)  Delusions: Yes  Delusions: Paranoid  Memory:Normal: No  Memory: Poor recent  Insight and Judgment: No  Insight and Judgment: Poor insight, Poor judgment    Tobacco Screening:  Practical Counseling, on admission, mariela X, if applicable and completed (first 3 are required if patient doesn't refuse):            ( ) Recognizing danger situations (included triggers and roadblocks)                    ( ) Coping skills (new ways to manage stress,relaxation techniques, changing routine, distraction)                                                           ( ) Basic information about quitting (benefits of quitting, techniques in how to quit, available

## 2023-11-30 NOTE — PROGRESS NOTES
4 Eyes Skin Assessment     The patient is being assessed for  Admission    I agree that 2 RN's have performed a thorough Head to Toe Skin Assessment on the patient. ALL assessment sites listed below have been assessed. Areas assessed for pressure by both nurses:   [x]   Head, Face, and Ears   [x]   Shoulders, Back, and Chest  [x]   Arms, Elbows, and Hands   [x]   Coccyx, Sacrum, and Ischum  [x]   Legs, Feet, and Heels                                Skin Assessed Under all Medical Devices by both nurses:  None in place               All Mepilex Borders were peeled back and area peeked at by both nurses:  No: none  Please list where Mepilex Borders are located:  N/A                 Does the Patient have Skin Breakdown related to pressure?   No              Jax Prevention initiated:  NA   Wound Care Orders initiated:  NA      [facility] nurse consulted for Pressure Injury (Stage 3,4, Unstageable, DTI, NWPT, Complex wounds)and New or Established Ostomies:  NA      Patient scabbed over abrasions scattered on BLE, and hands      Nurse 1 eSignature: Electronically signed by Yovani Cormier RN on 11/29/23 at 11:33 PM EST    **SHARE this note so that the co-signing nurse is able to place an eSignature**    Nurse 2 eSignature: Electronically signed by Zeyad Rapp RN on 11/30/23 at 1:57 AM EST

## 2023-11-30 NOTE — PLAN OF CARE
[facility]  Treatment Team Note  Review Date & Time: 11/30/23 0930    Patient was not in treatment team      Status EXAM:   Mental Status and Behavioral Exam  Normal: No  Level of Assistance: Independent/Self  Facial Expression: Worried, Flat  Affect: Constricted  Level of Consciousness: Alert  Frequency of Checks: 4 times per hour, close  Mood:Normal: No  Mood: Anxious, Sad  Motor Activity:Normal: Yes  Eye Contact: Good  Observed Behavior: Guarded, Cooperative, Friendly  Sexual Misconduct History: Current - no  Preception: [place] to person, [place] to time, [place] to place, [place] to situation  Attention:Normal: Yes  Thought Processes: Circumstantial  Thought Content:Normal: No  Thought Content: Poverty of content  Depression Symptoms: Appetite change, Change in energy level  Anxiety Symptoms: Generalized  Carli Symptoms: Pressured speech, Poor judgment  Hallucinations: Auditory (comment)  Delusions: Yes  Delusions: Paranoid  Memory:Normal: No  Memory: Poor recent  Insight and Judgment: No  Insight and Judgment: Poor insight, Poor judgment      Suicide Risk CSSR-S:  1) Within the past month, have you wished you were dead or wished you could go to sleep and not wake up? : Yes  2) Have you actually had any thoughts of killing yourself? : No  3) Have you been thinking about how you might kill yourself? : No  5) Have you started to work out or worked out the details of how to kill yourself? Do you intend to carry out this plan? : No  6) Have you ever done anything, started to do anything, or prepared to do anything to end your life?: No      PLAN/TREATMENT RECOMMENDATIONS UPDATE:   Patient will take medication as prescribed, eat 75% of meals, attend groups, participate in milieu activities, participate in treatment team and care planning for discharge and follow up.            Gabriele Ray RN

## 2023-12-01 VITALS
SYSTOLIC BLOOD PRESSURE: 124 MMHG | TEMPERATURE: 96.8 F | DIASTOLIC BLOOD PRESSURE: 99 MMHG | WEIGHT: 158.73 LBS | HEIGHT: 73 IN | HEART RATE: 100 BPM | RESPIRATION RATE: 16 BRPM | BODY MASS INDEX: 21.04 KG/M2 | OXYGEN SATURATION: 99 %

## 2023-12-01 LAB
EST. AVERAGE GLUCOSE BLD GHB EST-MCNC: 111.2 MG/DL
HBA1C MFR BLD: 5.5 %

## 2023-12-01 PROCEDURE — 6370000000 HC RX 637 (ALT 250 FOR IP): Performed by: EMERGENCY MEDICINE

## 2023-12-01 PROCEDURE — 99239 HOSP IP/OBS DSCHRG MGMT >30: CPT

## 2023-12-01 PROCEDURE — 5130000000 HC BRIDGE APPOINTMENT

## 2023-12-01 RX ORDER — ARIPIPRAZOLE 5 MG/1
5 TABLET ORAL DAILY
Qty: 30 TABLET | Refills: 0 | Status: SHIPPED | OUTPATIENT
Start: 2023-12-01

## 2023-12-01 NOTE — PROGRESS NOTES
Bridge Appointment completed: Reviewed Discharge Instructions with patient. Patient verbalizes understanding and agreement with the discharge plan using the teachback method.      Referral for Outpatient Tobacco Cessation Counseling, upon discharge (mariela X if applicable and completed):    ( )  Hospital staff assisted patient to call Quit Line or faxed referral                                   during hospitalization                  ( )  Recognizing danger situations (included triggers and roadblocks), if not completed on admission                    ( )  Coping skills (new ways to manage stress, exercise, relaxation techniques, changing routine, distraction), if not completed on admission                                                           ( )  Basic information about quitting (benefits of quitting, techniques in how to quit, available resources, if not completed on admission  ( ) Referral for counseling faxed to [address] [place] [address]   ( ) Patient refused referral  ( x) Patient refused counseling  ( ) Patient refused smoking cessation medication upon discharge    Vaccinations (mariela X if applicable and completed):  ( ) Patient states already received influenza vaccine elsewhere  ( ) Patient received influenza vaccine during this hospitalization  ( x) Patient refused influenza vaccine at this time  ( ) Not offered

## 2023-12-01 NOTE — DISCHARGE INSTRUCTIONS
enrollment is available. They are available for walk-in appointments Monday - Thursday 8:30 AM - 4:30 PM and Friday 8:30 AM - 3:30 PM.  Please bring a photo ID, insurance card, proof of address, current medicines, and copay. Sliding fee scale is available for [facility] residents who do not have insurance with proof of residency. Name of Provider: [address] [place] [address]   Provider specialty/license: FREDDY/SARA/MD  Date and time of appointment: Outpatient behavioral health Assessments for adults are available on a walk-in basis at the following service locations. No appointment necessary during open access hours. [facility] Locations:  Walk-In times: 8:30am- 12:00pm, Monday through Thursday (except holidays)   [address]: [address],[facility] [address] - [phone]  [place]: [facility], [facility] [address] - [phone]  The type/s of services requested are: case management, therapy, medication management  Special instructions (what to bring to appointment, etc.): Please bring a photo ID, insurance card, proof of address, current medicines, and copay. Discharge Completed By: Eleni Buckner MM, MT-BC      The following personal items were collected during your admission, stored in locker and/or safe, and were returned to you:    Belongings  Dental Appliances: None  Vision - Corrective Lenses: None  Hearing Aid: None  Clothing: Hat, Footwear, Shirt, Pants, Socks, Jacket/Coat  Jewelry: None  Body Piercings Removed: N/A  Electronic Devices: None  Weapons (Notify Protective Services/Security): None  Other Valuables: Lighter/Matches, Cigarettes, Wallet, Other (Comment) ([employer])  Home Medications: None  Valuables Given To: Locker  Provide Name(s) of Who Valuable(s) Were Given To: N/A    By signing below, I understand and acknowledge receipt of the instructions indicated above.

## 2023-12-01 NOTE — PLAN OF CARE
Patient has been relaxed, isolative to room. Nicotine patch given but Abilify held d/t patient requesting to take at night [facility] aware. Denies SI/HI/Avh.  Looking forward to d/c later today

## 2023-12-01 NOTE — PROGRESS NOTES
[facility]  Discharge Note    Pt discharged with followings belongings:   Dental Appliances: None  Vision - Corrective Lenses: None  Hearing Aid: None  Jewelry: None  Body Piercings Removed: N/A  Clothing: Hat, Footwear, Shirt, Pants, Socks, Jacket/Coat  Other Valuables: Lighter/Matches, Cigarettes, Wallet, Other (Comment) ([employer], [employer] CARD)   Valuables sent home withpatient  or returned to patient. Patient educated on aftercare instructions: yes  Information faxed to n/a by   at 2:51 PM .Patient verbalize understanding of AVS:  yes. Status EXAM upon discharge:  Mental Status and Behavioral Exam  Normal: No  Level of Assistance: Independent/Self  Facial Expression: Worried  Affect: Congruent  Level of Consciousness: Alert  Frequency of Checks: 4 times per hour, close  Mood:Normal: No  Mood: Anxious  Motor Activity:Normal: Yes  Eye Contact: Good  Observed Behavior: Cooperative, Friendly  Sexual Misconduct History: Past - no  Preception: [place] to person, [place] to time, [place] to place, [place] to situation  Attention:Normal: Yes  Thought Processes: Circumstantial  Thought Content:Normal: No  Thought Content: Poverty of content  Depression Symptoms: No problems reported or observed. Anxiety Symptoms: Generalized  Carli Symptoms: No problems reported or observed. Hallucinations: None  Delusions: No  Delusions:  Other (comment)  Memory:Normal: No  Memory: Poor recent  Insight and Judgment: No  Insight and Judgment: Poor judgment, Poor insight    Tobacco Screening:  Practical Counseling, on admission, mariela X, if applicable and completed (first 3 are required if patient doesn't refuse):            ( ) Recognizing danger situations (included triggers and roadblocks)                    ( ) Coping skills (new ways to manage stress,relaxation techniques, changing routine, distraction)                                                           ( ) Basic information about quitting (benefits of

## 2023-12-01 NOTE — PLAN OF CARE
Pt calm+pleasant. Denies all. Feels safe in the unit. No signs of aggressive behavior. Relevantly responding. Reports good appetite and sleep. Behaviorally manageable.    Problem: Self Harm/Suicidality  Goal: Will have no self-injury during hospital stay  12/1/2023 0547 by Jovanna Ferrera RN  Outcome: Progressing  Flowsheets (Taken 12/1/2023 8618)  Will have no self-injury during hospital stay:   Ensure safety tray has been added to patient's diet order   Secure patient belongings   Ensure family/visitors adhere to safety recommendations   Ensure constant observer at bedside with Q15M safety checks   Maintain a safe environment  12/1/2023 0547 by Jovanna Ferrera RN  Outcome: Progressing  Flowsheets (Taken 12/1/2023 0547)  Will have no self-injury during hospital stay:   Ensure safety tray has been added to patient's diet order   Secure patient belongings   Ensure family/visitors adhere to safety recommendations   Ensure constant observer at bedside with Q15M safety checks   Maintain a safe environment  11/30/2023 1734 by Jacquelin Ma RN  Outcome: Progressing     Problem: Drug Abuse/Detox  Goal: Will have no detox symptoms and will verbalize plan for changing drug-related behavior  12/1/2023 0547 by Jovanna Ferrera RN  Outcome: Progressing  Flowsheets (Taken 12/1/2023 0547)  Will have no detox symptoms and will verbalize plan for changing drug-related behavior:   Provide emotional support with 1:1 interaction with staff   Monitor physical status   Administer medication as ordered  12/1/2023 0547 by Jovanna Ferrera RN  Outcome: Progressing  Flowsheets (Taken 12/1/2023 0547)  Will have no detox symptoms and will verbalize plan for changing drug-related behavior:   Provide emotional support with 1:1 interaction with staff   Monitor physical status   Administer medication as ordered  11/30/2023 1734 by Jacquelin Ma RN  Outcome: Progressing     Problem: Depression  Goal: Will be euthymic at discharge  12/1/2023 0547 by Vargas Nguyen

## 2023-12-04 ENCOUNTER — FOLLOWUP TELEPHONE ENCOUNTER (OUTPATIENT)
Dept: PSYCHIATRY | Age: 38
End: 2023-12-04

## 2023-12-05 ENCOUNTER — HOSPITAL ENCOUNTER (EMERGENCY)
Age: 38
Discharge: HOME | End: 2023-12-05
Payer: COMMERCIAL

## 2023-12-05 ENCOUNTER — FOLLOWUP TELEPHONE ENCOUNTER (OUTPATIENT)
Dept: PSYCHIATRY | Age: 38
End: 2023-12-05

## 2023-12-05 VITALS
DIASTOLIC BLOOD PRESSURE: 98 MMHG | OXYGEN SATURATION: 98 % | HEART RATE: 98 BPM | RESPIRATION RATE: 20 BRPM | BODY MASS INDEX: 20.81 KG/M2 | SYSTOLIC BLOOD PRESSURE: 127 MMHG | WEIGHT: 157 LBS | TEMPERATURE: 97.2 F | HEIGHT: 73 IN

## 2023-12-05 DIAGNOSIS — R11.0 NAUSEA: Primary | ICD-10-CM

## 2023-12-05 LAB
ALBUMIN SERPL-MCNC: 4.5 G/DL (ref 3.4–5)
ALBUMIN/GLOB SERPL: 1.5 {RATIO} (ref 1.1–2.2)
ALP SERPL-CCNC: 77 U/L (ref 40–129)
ALT SERPL-CCNC: 16 U/L (ref 10–40)
AMPHETAMINES UR QL SCN>1000 NG/ML: ABNORMAL
ANION GAP SERPL CALCULATED.3IONS-SCNC: 8 MMOL/L (ref 3–16)
AST SERPL-CCNC: 15 U/L (ref 15–37)
BARBITURATES UR QL SCN>200 NG/ML: ABNORMAL
BASOPHILS # BLD: 0 K/UL (ref 0–0.2)
BASOPHILS NFR BLD: 0.3 %
BENZODIAZ UR QL SCN>200 NG/ML: ABNORMAL
BILIRUB SERPL-MCNC: <0.2 MG/DL (ref 0–1)
BILIRUB UR QL STRIP.AUTO: NEGATIVE
BUN SERPL-MCNC: 7 MG/DL (ref 7–20)
CALCIUM SERPL-MCNC: 9.5 MG/DL (ref 8.3–10.6)
CANNABINOIDS UR QL SCN>50 NG/ML: POSITIVE
CHLORIDE SERPL-SCNC: 99 MMOL/L (ref 99–110)
CLARITY UR: CLEAR
CO2 SERPL-SCNC: 29 MMOL/L (ref 21–32)
COCAINE UR QL SCN: ABNORMAL
COLOR UR: YELLOW
CREAT SERPL-MCNC: 0.9 MG/DL (ref 0.9–1.3)
DEPRECATED RDW RBC AUTO: 13.2 % (ref 12.4–15.4)
DRUG SCREEN COMMENT UR-IMP: ABNORMAL
EOSINOPHIL # BLD: 0.4 K/UL (ref 0–0.6)
EOSINOPHIL NFR BLD: 2.8 %
FENTANYL SCREEN, URINE: ABNORMAL
GFR SERPLBLD CREATININE-BSD FMLA CKD-EPI: >60 ML/MIN/{1.73_M2}
GLUCOSE SERPL-MCNC: 106 MG/DL (ref 70–99)
GLUCOSE UR STRIP.AUTO-MCNC: NEGATIVE MG/DL
HCT VFR BLD AUTO: 45.4 % (ref 40.5–52.5)
HGB BLD-MCNC: 15.4 G/DL (ref 13.5–17.5)
HGB UR QL STRIP.AUTO: NEGATIVE
KETONES UR STRIP.AUTO-MCNC: NEGATIVE MG/DL
LEUKOCYTE ESTERASE UR QL STRIP.AUTO: NEGATIVE
LIPASE SERPL-CCNC: 34 U/L (ref 13–60)
LYMPHOCYTES # BLD: 2.6 K/UL (ref 1–5.1)
LYMPHOCYTES NFR BLD: 20.4 %
MCH RBC QN AUTO: 30.1 PG (ref 26–34)
MCHC RBC AUTO-ENTMCNC: 34 G/DL (ref 31–36)
MCV RBC AUTO: 88.7 FL (ref 80–100)
METHADONE UR QL SCN>300 NG/ML: ABNORMAL
MONOCYTES # BLD: 0.6 K/UL (ref 0–1.3)
MONOCYTES NFR BLD: 5 %
NEUTROPHILS # BLD: 9.2 K/UL (ref 1.7–7.7)
NEUTROPHILS NFR BLD: 71.5 %
NITRITE UR QL STRIP.AUTO: NEGATIVE
OPIATES UR QL SCN>300 NG/ML: ABNORMAL
OXYCODONE UR QL SCN: ABNORMAL
PCP UR QL SCN>25 NG/ML: ABNORMAL
PH UR STRIP.AUTO: 6 [PH] (ref 5–8)
PH UR STRIP: 6 [PH]
PLATELET # BLD AUTO: 318 K/UL (ref 135–450)
PMV BLD AUTO: 8.2 FL (ref 5–10.5)
POTASSIUM SERPL-SCNC: 3.9 MMOL/L (ref 3.5–5.1)
PROT SERPL-MCNC: 7.6 G/DL (ref 6.4–8.2)
PROT UR STRIP.AUTO-MCNC: NEGATIVE MG/DL
RBC # BLD AUTO: 5.12 M/UL (ref 4.2–5.9)
SODIUM SERPL-SCNC: 136 MMOL/L (ref 136–145)
SP GR UR STRIP.AUTO: 1.02 (ref 1–1.03)
UA DIPSTICK W REFLEX MICRO PNL UR: NORMAL
URN SPEC COLLECT METH UR: NORMAL
UROBILINOGEN UR STRIP-ACNC: 0.2 E.U./DL
WBC # BLD AUTO: 12.8 K/UL (ref 4–11)

## 2023-12-05 PROCEDURE — 81003 URINALYSIS AUTO W/O SCOPE: CPT

## 2023-12-05 PROCEDURE — 80307 DRUG TEST PRSMV CHEM ANLYZR: CPT

## 2023-12-05 PROCEDURE — 85025 COMPLETE CBC W/AUTO DIFF WBC: CPT

## 2023-12-05 PROCEDURE — 36415 COLL VENOUS BLD VENIPUNCTURE: CPT

## 2023-12-05 PROCEDURE — 83690 ASSAY OF LIPASE: CPT

## 2023-12-05 PROCEDURE — 99283 EMERGENCY DEPT VISIT LOW MDM: CPT

## 2023-12-05 PROCEDURE — 80053 COMPREHEN METABOLIC PANEL: CPT

## 2023-12-05 ASSESSMENT — ENCOUNTER SYMPTOMS
ABDOMINAL PAIN: 0
EYE PAIN: 0
COUGH: 0
VOMITING: 0
SORE THROAT: 0
RHINORRHEA: 0
NAUSEA: 1
CONSTIPATION: 1
SHORTNESS OF BREATH: 0
BACK PAIN: 0

## 2023-12-05 ASSESSMENT — PAIN - FUNCTIONAL ASSESSMENT: PAIN_FUNCTIONAL_ASSESSMENT: NONE - DENIES PAIN

## 2023-12-05 NOTE — PROGRESS NOTES
PT is houseless and is hoping to temporarily live with his sister in [place]. PT stated he needs to move away from the environment that isn't helpful to his recovery. [employer] member provided PT with houseless shelters in [place] as well as food pantries. In addition, PT was provided a houseless food kit.

## 2023-12-06 ENCOUNTER — FOLLOWUP TELEPHONE ENCOUNTER (OUTPATIENT)
Dept: PSYCHIATRY | Age: 38
End: 2023-12-06

## 2024-01-19 ENCOUNTER — APPOINTMENT (OUTPATIENT)
Dept: GENERAL RADIOLOGY | Age: 39
End: 2024-01-19
Payer: COMMERCIAL

## 2024-01-19 ENCOUNTER — HOSPITAL ENCOUNTER (EMERGENCY)
Age: 39
Discharge: HOME | End: 2024-01-19
Payer: COMMERCIAL

## 2024-01-19 VITALS
OXYGEN SATURATION: 100 % | RESPIRATION RATE: 16 BRPM | HEIGHT: 61 IN | TEMPERATURE: 98 F | WEIGHT: 176.6 LBS | BODY MASS INDEX: 33.34 KG/M2 | DIASTOLIC BLOOD PRESSURE: 87 MMHG | HEART RATE: 93 BPM | SYSTOLIC BLOOD PRESSURE: 139 MMHG

## 2024-01-19 DIAGNOSIS — R60.9 PERIPHERAL EDEMA: Primary | ICD-10-CM

## 2024-01-19 LAB
ALBUMIN SERPL-MCNC: 4.3 G/DL (ref 3.4–5)
ALBUMIN/GLOB SERPL: 1.5 {RATIO} (ref 1.1–2.2)
ALP SERPL-CCNC: 83 U/L (ref 40–129)
ALT SERPL-CCNC: 19 U/L (ref 10–40)
AMPHETAMINES UR QL SCN>1000 NG/ML: NORMAL
ANION GAP SERPL CALCULATED.3IONS-SCNC: 9 MMOL/L (ref 3–16)
AST SERPL-CCNC: 16 U/L (ref 15–37)
BARBITURATES UR QL SCN>200 NG/ML: NORMAL
BASOPHILS # BLD: 0.1 K/UL (ref 0–0.2)
BASOPHILS NFR BLD: 0.4 %
BENZODIAZ UR QL SCN>200 NG/ML: NORMAL
BILIRUB SERPL-MCNC: <0.2 MG/DL (ref 0–1)
BILIRUB UR QL STRIP.AUTO: NEGATIVE
BUN SERPL-MCNC: 16 MG/DL (ref 7–20)
CALCIUM SERPL-MCNC: 9 MG/DL (ref 8.3–10.6)
CANNABINOIDS UR QL SCN>50 NG/ML: NORMAL
CHLORIDE SERPL-SCNC: 101 MMOL/L (ref 99–110)
CLARITY UR: CLEAR
CO2 SERPL-SCNC: 29 MMOL/L (ref 21–32)
COCAINE UR QL SCN: NORMAL
COLOR UR: YELLOW
CREAT SERPL-MCNC: 0.8 MG/DL (ref 0.9–1.3)
DEPRECATED RDW RBC AUTO: 13.4 % (ref 12.4–15.4)
DRUG SCREEN COMMENT UR-IMP: NORMAL
EKG ATRIAL RATE: 88 BPM
EKG DIAGNOSIS: NORMAL
EKG P AXIS: 48 DEGREES
EKG P-R INTERVAL: 158 MS
EKG Q-T INTERVAL: 332 MS
EKG QRS DURATION: 88 MS
EKG QTC CALCULATION (BAZETT): 401 MS
EKG R AXIS: 21 DEGREES
EKG T AXIS: 32 DEGREES
EKG VENTRICULAR RATE: 88 BPM
EOSINOPHIL # BLD: 1 K/UL (ref 0–0.6)
EOSINOPHIL NFR BLD: 8.1 %
FENTANYL SCREEN, URINE: NORMAL
GFR SERPLBLD CREATININE-BSD FMLA CKD-EPI: >60 ML/MIN/{1.73_M2}
GLUCOSE SERPL-MCNC: 103 MG/DL (ref 70–99)
GLUCOSE UR STRIP.AUTO-MCNC: NEGATIVE MG/DL
HCT VFR BLD AUTO: 44.1 % (ref 40.5–52.5)
HGB BLD-MCNC: 14.5 G/DL (ref 13.5–17.5)
HGB UR QL STRIP.AUTO: NEGATIVE
KETONES UR STRIP.AUTO-MCNC: NEGATIVE MG/DL
LEUKOCYTE ESTERASE UR QL STRIP.AUTO: NEGATIVE
LYMPHOCYTES # BLD: 2.8 K/UL (ref 1–5.1)
LYMPHOCYTES NFR BLD: 22.5 %
MCH RBC QN AUTO: 29.4 PG (ref 26–34)
MCHC RBC AUTO-ENTMCNC: 33 G/DL (ref 31–36)
MCV RBC AUTO: 89.2 FL (ref 80–100)
METHADONE UR QL SCN>300 NG/ML: NORMAL
MONOCYTES # BLD: 0.7 K/UL (ref 0–1.3)
MONOCYTES NFR BLD: 6.1 %
NEUTROPHILS # BLD: 7.7 K/UL (ref 1.7–7.7)
NEUTROPHILS NFR BLD: 62.9 %
NITRITE UR QL STRIP.AUTO: NEGATIVE
NT-PROBNP SERPL-MCNC: <36 PG/ML (ref 0–124)
OPIATES UR QL SCN>300 NG/ML: NORMAL
OXYCODONE UR QL SCN: NORMAL
PCP UR QL SCN>25 NG/ML: NORMAL
PH UR STRIP.AUTO: 6.5 [PH] (ref 5–8)
PH UR STRIP: 6.5 [PH]
PLATELET # BLD AUTO: 321 K/UL (ref 135–450)
PMV BLD AUTO: 7.6 FL (ref 5–10.5)
POTASSIUM SERPL-SCNC: 4.5 MMOL/L (ref 3.5–5.1)
PROT SERPL-MCNC: 7.2 G/DL (ref 6.4–8.2)
PROT UR STRIP.AUTO-MCNC: NEGATIVE MG/DL
RBC # BLD AUTO: 4.94 M/UL (ref 4.2–5.9)
SODIUM SERPL-SCNC: 139 MMOL/L (ref 136–145)
SP GR UR STRIP.AUTO: 1.01 (ref 1–1.03)
TROPONIN, HIGH SENSITIVITY: 9 NG/L (ref 0–22)
UA COMPLETE W REFLEX CULTURE PNL UR: NORMAL
UA DIPSTICK W REFLEX MICRO PNL UR: NORMAL
URN SPEC COLLECT METH UR: NORMAL
UROBILINOGEN UR STRIP-ACNC: 0.2 E.U./DL
WBC # BLD AUTO: 12.3 K/UL (ref 4–11)

## 2024-01-19 PROCEDURE — 93005 ELECTROCARDIOGRAM TRACING: CPT | Performed by: PHYSICIAN ASSISTANT

## 2024-01-19 PROCEDURE — 36415 COLL VENOUS BLD VENIPUNCTURE: CPT

## 2024-01-19 PROCEDURE — 71045 X-RAY EXAM CHEST 1 VIEW: CPT

## 2024-01-19 PROCEDURE — 99285 EMERGENCY DEPT VISIT HI MDM: CPT

## 2024-01-19 PROCEDURE — 80307 DRUG TEST PRSMV CHEM ANLYZR: CPT

## 2024-01-19 PROCEDURE — 81003 URINALYSIS AUTO W/O SCOPE: CPT

## 2024-01-19 PROCEDURE — 80053 COMPREHEN METABOLIC PANEL: CPT

## 2024-01-19 PROCEDURE — 93010 ELECTROCARDIOGRAM REPORT: CPT | Performed by: INTERNAL MEDICINE

## 2024-01-19 PROCEDURE — 84484 ASSAY OF TROPONIN QUANT: CPT

## 2024-01-19 PROCEDURE — 85025 COMPLETE CBC W/AUTO DIFF WBC: CPT

## 2024-01-19 PROCEDURE — 83880 ASSAY OF NATRIURETIC PEPTIDE: CPT

## 2024-01-19 ASSESSMENT — PAIN - FUNCTIONAL ASSESSMENT: PAIN_FUNCTIONAL_ASSESSMENT: NONE - DENIES PAIN

## 2024-01-19 ASSESSMENT — LIFESTYLE VARIABLES
HOW OFTEN DO YOU HAVE A DRINK CONTAINING ALCOHOL: NEVER
HOW MANY STANDARD DRINKS CONTAINING ALCOHOL DO YOU HAVE ON A TYPICAL DAY: PATIENT DOES NOT DRINK

## 2024-01-19 NOTE — ED PROVIDER NOTES
ECG    The Ekg interpreted by me shows sinus rhythm with a rate of 88 bpm.  Normal axis.  No acute injury pattern.  , QRS 88, QTc 401.    No significant change from prior EKG dated 11/30/2023     Abelardo Garibay DO  01/19/24 1554

## 2024-01-19 NOTE — ED NOTES
Pt discharge instructions reviewed with pt. Pt verbalized understanding of follow-up care and denied any questions. Pt states his friend is on the way to pick him up and drive him home. Pt educated to wait in the lobby until his friend arrives.

## 2024-01-19 NOTE — DISCHARGE INSTRUCTIONS
Referral to [facility] for follow-up.  Return to the ER for any worsening symptoms such as increased swelling, chest pain or trouble breathing.